# Patient Record
Sex: MALE | Race: OTHER | URBAN - METROPOLITAN AREA
[De-identification: names, ages, dates, MRNs, and addresses within clinical notes are randomized per-mention and may not be internally consistent; named-entity substitution may affect disease eponyms.]

---

## 2017-01-11 ENCOUNTER — APPOINTMENT (RX ONLY)
Dept: URBAN - METROPOLITAN AREA CLINIC 153 | Facility: CLINIC | Age: 82
Setting detail: DERMATOLOGY
End: 2017-01-11

## 2017-01-11 PROBLEM — C49.0 MALIGNANT NEOPLASM OF CONNECTIVE AND SOFT TISSUE OF HEAD, FACE AND NECK: Status: ACTIVE | Noted: 2017-01-11

## 2017-01-11 PROCEDURE — ? POST-OP WOUND CHECK

## 2017-01-11 NOTE — PROCEDURE: POST-OP WOUND CHECK
Add 62870 Cpt? (Important Note: In 2017 The Use Of 90088 Is Being Tracked By Cms To Determine Future Global Period Reimbursement For Global Periods): no
Body Location Override (Optional - Billing Will Still Be Based On Selected Body Map Location If Applicable): right antihelix
Detail Level: Detailed
Wound Evaluated By: Mary Miramontes PA-C

## 2017-06-12 ENCOUNTER — APPOINTMENT (RX ONLY)
Dept: URBAN - METROPOLITAN AREA CLINIC 153 | Facility: CLINIC | Age: 82
Setting detail: DERMATOLOGY
End: 2017-06-12

## 2017-06-12 VITALS — SYSTOLIC BLOOD PRESSURE: 136 MMHG | DIASTOLIC BLOOD PRESSURE: 88 MMHG | HEIGHT: 74 IN | WEIGHT: 210 LBS

## 2017-06-12 PROBLEM — C44.329 SQUAMOUS CELL CARCINOMA OF SKIN OF OTHER PARTS OF FACE: Status: ACTIVE | Noted: 2017-06-12

## 2017-06-12 PROBLEM — C44.629 SQUAMOUS CELL CARCINOMA OF SKIN OF LEFT UPPER LIMB, INCLUDING SHOULDER: Status: ACTIVE | Noted: 2017-06-12

## 2017-06-12 PROBLEM — L29.8 OTHER PRURITUS: Status: ACTIVE | Noted: 2017-06-12

## 2017-06-12 PROCEDURE — 13121 CMPLX RPR S/A/L 2.6-7.5 CM: CPT | Mod: 59

## 2017-06-12 PROCEDURE — 17314 MOHS ADDL STAGE T/A/L: CPT

## 2017-06-12 PROCEDURE — 17313 MOHS 1 STAGE T/A/L: CPT

## 2017-06-12 PROCEDURE — ? MOHS SURGERY

## 2017-06-12 PROCEDURE — 14041 TIS TRNFR F/C/C/M/N/A/G/H/F: CPT

## 2017-06-12 PROCEDURE — A4550 SURGICAL TRAYS: HCPCS

## 2017-06-12 PROCEDURE — 17311 MOHS 1 STAGE H/N/HF/G: CPT

## 2017-06-12 NOTE — HPI: PROCEDURE (MOHS)
Has The Growth Been Previously Biopsied?: has been previously biopsied
Additional History: The site on the forearm has been enlarging, red, and sore.  The right sideburn area has been rough and irritated from shaving.
Year Removed: 1900

## 2017-06-12 NOTE — PROCEDURE: MOHS SURGERY
Closure 4 Information: This tab is for additional flaps and grafts above and beyond our usual structured repairs.  Please note if you enter information here it will not currently bill and you will need to add the billing information manually.
Quadrants Reporting?: 0
Tarsorrhaphy Performed?: No
Subsequent Stages Histo Method Verbiage: The area was prepped in the usual fashion. Using a similar technique to that described above, a thin\\nlayer of tissue was removed from all areas where tumor was visible on the previous stage.  The tissue was again\\noriented, mapped, dyed, and processed as above. After hemostasis, the specimen was oriented, mapped and\\ndivided
Muscle Hinge Flap Text: The defect edges were debeveled with a #15 scalpel blade.  Given the size, depth and location of the defect and the proximity to free margins a muscle hinge flap was deemed most appropriate.  Using a sterile surgical marker, an appropriate hinge flap was drawn incorporating the defect. The area thus outlined was incised with a #15 scalpel blade.  The skin margins were undermined to an appropriate distance in all directions utilizing iris scissors.
Flap Type: Advancement Flap (Single)
Bill For Surgical Tray: yes
Stage 14: Additional Anesthesia Type: 1% lidocaine with epinephrine
Primary Defect Length In Cm (Final Defect Size - Required For Flaps/Grafts): 3.2
Stage 2: Number Of Blocks?: 1
Surgeon/Pathologist Verbiage (Will Incorporate Name Of Surgeon From Intro If Not Blank): operated in two distinct and integrated capacities as the surgeon and pathologist.
Bilobed Transposition Flap Text: The defect edges were debeveled with a #15 scalpel blade.  Given the location of the defect and the proximity to free margins a bilobed transposition flap was deemed most appropriate.  Using a sterile surgical marker, an appropriate bilobe flap drawn around the defect.    The area thus outlined was incised deep to adipose tissue with a #15 scalpel blade.  The skin margins were undermined to an appropriate distance in all directions utilizing iris scissors.
Initial Size Of Lesion: 2.2
Graft Donor Site Epidermal Sutures (Optional): 4-0 Monocryl
Area H Indication Text: Tumors in this location are included in\\nArea H (central face, eyelids, canthi, eyebrows, nose, lips, chin, ears, genitals, hands, feet, nail units, ankles, nipples and areola).
Area M Indication Text: Tumors in this location are included in\\nArea M (cheek, forehead, scalp, neck, jawline and pretibial skin).
Tumor Debulked?: curette
Bilobed Transposition Flap Text: The defect edges were debeveled with a #15 scalpel blade.  Given the location of the defect and the proximity to free margins a bilobed transposition flap was deemed most appropriate.  Using a sterile surgical marker, an appropriate bilobe flap drawn around the defect.    The area thus outlined was incised deep to adipose tissue with a #15 scalpel blade.  The skin margins were undermined to an appropriate distance in all directions utilizing iris scissors.
Subsequent Stages Histo Method Verbiage: The area was prepped in the usual fashion. Using a similar technique to that described above, a thin\\nlayer of tissue was removed from all areas where tumor was visible on the previous stage.  The tissue was again\\noriented, mapped, dyed, and processed as above. After hemostasis, the specimen was oriented, mapped and\\ndivided
Initial Size Of Lesion: 1.9
Tarsorrhaphy Text: A tarsorrhaphy was performed using Frost sutures.
Hemostasis: Electrocoagulation
Wound Check: 14 days
Complex Repair And Flap Additional Text (Will Appearing After The Standard Complex Repair Text): The complex repair was not sufficient to completely close the primary defect. The remaining additional defect was repaired with the flap mentioned below.
Closure 3 Information: This tab is for additional flaps and grafts above and beyond our usual structured repairs.  Please note if you enter information here it will not currently bill and you will need to add the billing information manually.
Primary Defect Width In Cm (Final Defect Size - Required For Flaps/Grafts): 2.5
Anesthesia Type: 1% lidocaine with 1:100,000 epinephrine and a 1:10 solution of 8.4% sodium bicarbonate
Medical Necessity Statement: Based on my medical judgement, Mohs surgery is the most appropriate treatment for this cancer compared to\\nother treatments. I discussed alternative treatments to Mohs surgery and specifically discussed the risks and\\nbenefits of curettage, excision with permanent sections, radiation therapy, and foregoing treatment. The rationale\\nfor Mohs surgery was explained to the patient and consent was obtained. The risks, benefits and alternatives to\\ntherapy were discussed in detail. Specifically, the risks of infection, scarring, bleeding, prolonged wound healing,\\nincomplete removal, allergy to anesthesia, nerve injury and recurrence were addressed. Prior to the procedure,\\nthe treatment site was clearly identified and confirmed by the patient. All components of Universal\\nProtocol/PAUSE Rule completed. All laboratory services were performed in the [facility], [address] [place] [address], [address], [place] [address].  Special stains are not necessarily approved by the U.S. Food and Drug Administration and are used to improve diagnostic accuracy.
Epidermal Closure Graft Donor Site (Optional): running
Post-Care Instructions: I reviewed in detail the post-care instructions.
Anesthesia Volume In Cc: 7.5
Consent (Lip)/Introductory Paragraph: The rationale for Mohs was explained to the patient and consent was obtained. The risks, benefits and alternatives to therapy were discussed in detail. Specifically, the risks of lip deformity, changes in the oral aperture, infection, scarring, bleeding, prolonged wound healing, incomplete removal, allergy to anesthesia, nerve injury and recurrence were addressed. Prior to the procedure, the treatment site was clearly identified and confirmed by the patient. All components of Universal Protocol/PAUSE Rule completed.
Dressing: pressure dressing with telfa
Skin Substitute Text: The defect edges were debeveled with a #15 scalpel blade.  Given the location of the defect, shape of the defect and the proximity to free margins a skin substitute graft was deemed most appropriate.  The graft material was trimmed to fit the size of the defect. The graft was then placed in the primary defect and oriented appropriately.
Consent Type: Consent 1 (Standard)
Mauc Instructions: By selecting yes to the question below the MAUC number will be added into the note.  This will be calculated automatically based on the diagnosis chosen, the size entered, the body zone selected (H,M,L) and the specific indications you chose. You will also have the option to override the Mohs AUC if you disagree with the automatically calculated number and this option is found in the Case Summary tab.
Surgeon Performing Repair (Optional): [facility] M.D.
Repair Anesthesia Method: local infiltration
Surgeon: [facility] M.D.
Mohs Method Verbiage: An incision at a 45 degree angle following the standard Mohs\\napproach was done and the specimen was harvested as a microscopically controlled layer.
Unna Boot Text: An Unna boot was placed to help immobilize the limb and facilitate more rapid healing.
Mohs Case Number: [id number]
Graft Basting Suture (Optional): 6-0 Fast Absorbing Gut
Location Indication Override (Is Already Calculated Based On Selected Body Location): Area L
Complex Repair And Graft Additional Text (Will Appearing After The Standard Complex Repair Text): The complex repair was not sufficient to completely close the primary defect. The remaining additional defect was repaired with the graft mentioned below.
Mohs Histo Method Verbiage: Each section was then chromacoded and processed in the Mohs lab using the Mohs protocol and submitted for frozen section.
Postop Diagnosis: same
Surgeon: [facility] M.D.
Location Indication Override (Is Already Calculated Based On Selected Body Location): Area H
Eye Protection Verbiage: Before proceeding with the stage, a plastic scleral shield was inserted. The globe was anesthetized with a few drops of 1% lidocaine with 1:100,000 epinephrine. Then, an appropriate sized scleral shield was chosen and coated with lacrilube ointment. The shield was gently inserted and left in place for the duration of each stage. After the stage was completed, the shield was gently removed.
Skin Substitute Text: The defect edges were debeveled with a #15 scalpel blade.  Given the location of the defect, shape of the defect and the proximity to free margins a skin substitute graft was deemed most appropriate.  The graft material was trimmed to fit the size of the defect. The graft was then placed in the primary defect and oriented appropriately.
Secondary Intention Text (Leave Blank If You Do Not Want): The defect will heal with secondary intention.
Star Wedge Flap Text: The defect edges were debeveled with a #15 scalpel blade.  Given the location of the defect, shape of the defect and the proximity to free margins a star wedge flap was deemed most appropriate.  Using a sterile surgical marker, an appropriate rotation flap was drawn incorporating the defect and placing the expected incisions within the relaxed skin tension lines where possible. The area thus outlined was incised deep to adipose tissue with a #15 scalpel blade.  The skin margins were undermined to an appropriate distance in all directions utilizing iris scissors.
Partial Purse String (Simple) Text: Given the location of the defect and the characteristics of the surrounding skin a simple purse string closure was deemed most appropriate.  Undermining was performed circumfirentially around the surgical defect.  A purse string suture was then placed and tightened. Wound tension only allowed a partial closure of the circular defect.
Secondary Defect Length In Cm (Required For Flaps): 3.7
Closure 2 Information: This tab is for additional flaps and grafts, including complex repair and grafts and complex repair and flaps. You can also specify a different location for the additional defect, if the location is the same you do not need to select a new one. We will insert the automated text for the repair you select below just as we do for solitary flaps and grafts. Please note that at this time if you select a location with a different insurance zone you will need to override the ICD10 and CPT if appropriate.
Anesthesia Volume In Cc: 3
Referred To Mid-Level For Closure Text (Leave Blank If You Do Not Want): After obtaining clear surgical margins the patient was sent to a mid-level provider for surgical repair.  The patient understands they will receive post-surgical care and follow-up from the mid-level provider.
Detail Level: Detailed
Anesthesia Volume In Cc: 5
Referring Physician (Optional): JESSY Long M.D.
Referred To Mid-Level For Closure Text (Leave Blank If You Do Not Want): After obtaining clear surgical margins the patient was sent to a mid-level provider for surgical repair.  The patient understands they will receive post-surgical care and follow-up from the mid-level provider.
Simple / Intermediate / Complex Repair - Final Wound Length In Cm: 6.4
Number Of Stages: 2
Partial Purse String (Intermediate) Text: Given the location of the defect and the characteristics of the surrounding skin an intermediate purse string closure was deemed most appropriate.  Undermining was performed circumfirentially around the surgical defect.  A purse string suture was then placed and tightened. Wound tension only allowed a partial closure of the circular defect.
X Size Of Lesion In Cm (Optional): 1.5
Estimated Blood Loss (Cc): minimal
Repair Type: Complex Repair
Manual Repair Warning Statement: We plan on removing the manually selected variable below in favor of our much easier automatic structured text blocks found in the previous tab. We decided to do this to help make the flow better and give you the full power of structured data. Manual selection is never going to be ideal in our platform and I would encourage you to avoid using manual selection from this point on, especially since I will be sunsetting this feature. It is important that you do one of two things with the customized text below. First, you can save all of the text in a word file so you can have it for future reference. Second, transfer the text to the appropriate area in the Library tab. Lastly, if there is a flap or graft type which we do not have you need to let us know right away so I can add it in before the variable is hidden. No need to panic, we plan to give you roughly 6 months to make the change.
Star Wedge Flap Text: The defect edges were debeveled with a #15 scalpel blade.  Given the location of the defect, shape of the defect and the proximity to free margins a star wedge flap was deemed most appropriate.  Using a sterile surgical marker, an appropriate rotation flap was drawn incorporating the defect and placing the expected incisions within the relaxed skin tension lines where possible. The area thus outlined was incised deep to adipose tissue with a #15 scalpel blade.  The skin margins were undermined to an appropriate distance in all directions utilizing iris scissors.
Anesthesia Type: 1% lidocaine with epinephrine and a 1:10 solution of 8.4% sodium bicarbonate
Mauc Instructions: By selecting yes to the question below the MAUC number will be added into the note.  This will be calculated automatically based on the diagnosis chosen, the size entered, the body zone selected (H,M,L) and the specific indications you chose. You will also have the option to override the Mohs AUC if you disagree with the automatically calculated number and this option is found in the Case Summary tab.
Primary Defect Width In Cm (Final Defect Size - Required For Flaps/Grafts): 1.6
Partial Purse String (Intermediate) Text: Given the location of the defect and the characteristics of the surrounding skin an intermediate purse string closure was deemed most appropriate.  Undermining was performed circumfirentially around the surgical defect.  A purse string suture was then placed and tightened. Wound tension only allowed a partial closure of the circular defect.
Area L Indication Text: Tumors in this location are included in\\nArea L (trunk and extremities).
Keystone Flap Text: The defect edges were debeveled with a #15 scalpel blade.  Given the location of the defect, shape of the defect a keystone flap was deemed most appropriate.  Using a sterile surgical marker, an appropriate keystone flap was drawn incorporating the defect, outlining the appropriate donor tissue and placing the expected incisions within the relaxed skin tension lines where possible. The area thus outlined was incised deep to adipose tissue with a #15 scalpel blade.  The skin margins were undermined to an appropriate distance in all directions around the primary defect and laterally outward around the flap utilizing iris scissors.
Wound Care: Polysporin ointment
Body Location Override (Optional - Billing Will Still Be Based On Selected Body Map Location If Applicable): Right zygoma
Muscle Hinge Flap Text: The defect edges were debeveled with a #15 scalpel blade.  Given the size, depth and location of the defect and the proximity to free margins a muscle hinge flap was deemed most appropriate.  Using a sterile surgical marker, an appropriate hinge flap was drawn incorporating the defect. The area thus outlined was incised with a #15 scalpel blade.  The skin margins were undermined to an appropriate distance in all directions utilizing iris scissors.
Partial Purse String (Simple) Text: Given the location of the defect and the characteristics of the surrounding skin a simple purse string closure was deemed most appropriate.  Undermining was performed circumfirentially around the surgical defect.  A purse string suture was then placed and tightened. Wound tension only allowed a partial closure of the circular defect.
Medical Necessity Statement: Based on my medical judgement, Mohs surgery is the most appropriate treatment for this cancer compared to\\nother treatments. I discussed alternative treatments to Mohs surgery and specifically discussed the risks and\\nbenefits of curettage, excision with permanent sections, radiation therapy, and foregoing treatment. The rationale\\nfor Mohs surgery was explained to the patient and consent was obtained. The risks, benefits and alternatives to\\ntherapy were discussed in detail. Specifically, the risks of infection, scarring, bleeding, prolonged wound healing,\\nincomplete removal, allergy to anesthesia, nerve injury and recurrence were addressed. Prior to the procedure,\\nthe treatment site was clearly identified and confirmed by the patient. All components of Universal\\nProtocol/PAUSE Rule completed. All laboratory services were performed in the [facility], [address] [place] [address], [address], [place] [address].  Special stains are not necessarily approved by the U.S. Food and Drug Administration and are used to improve diagnostic accuracy.
Keystone Flap Text: The defect edges were debeveled with a #15 scalpel blade.  Given the location of the defect, shape of the defect a keystone flap was deemed most appropriate.  Using a sterile surgical marker, an appropriate keystone flap was drawn incorporating the defect, outlining the appropriate donor tissue and placing the expected incisions within the relaxed skin tension lines where possible. The area thus outlined was incised deep to adipose tissue with a #15 scalpel blade.  The skin margins were undermined to an appropriate distance in all directions around the primary defect and laterally outward around the flap utilizing iris scissors.
Surgeon Performing Repair (Optional): [facility] M.D.
Body Location Override (Optional - Billing Will Still Be Based On Selected Body Map Location If Applicable): Left extensor forearm
Additional Anesthesia Volume In Cc: 6
Epidermal Closure: running and interrupted
Repair Type: Flap

## 2017-06-26 ENCOUNTER — APPOINTMENT (RX ONLY)
Dept: URBAN - METROPOLITAN AREA CLINIC 153 | Facility: CLINIC | Age: 82
Setting detail: DERMATOLOGY
End: 2017-06-26

## 2017-06-26 PROBLEM — C44.329 SQUAMOUS CELL CARCINOMA OF SKIN OF OTHER PARTS OF FACE: Status: ACTIVE | Noted: 2017-06-26

## 2017-06-26 PROBLEM — Z85.46 PERSONAL HISTORY OF MALIGNANT NEOPLASM OF PROSTATE: Status: ACTIVE | Noted: 2017-06-26

## 2017-06-26 PROBLEM — C44.629 SQUAMOUS CELL CARCINOMA OF SKIN OF LEFT UPPER LIMB, INCLUDING SHOULDER: Status: ACTIVE | Noted: 2017-06-26

## 2017-06-26 PROCEDURE — ? SUTURE REMOVAL (GLOBAL PERIOD)

## 2017-06-26 NOTE — PROCEDURE: SUTURE REMOVAL (GLOBAL PERIOD)
Detail Level: Detailed
Add 03108 Cpt? (Important Note: In 2017 The Use Of 94299 Is Being Tracked By Cms To Determine Future Global Period Reimbursement For Global Periods): no

## 2018-11-06 ENCOUNTER — APPOINTMENT (RX ONLY)
Dept: URBAN - METROPOLITAN AREA CLINIC 153 | Facility: CLINIC | Age: 83
Setting detail: DERMATOLOGY
End: 2018-11-06

## 2018-11-06 VITALS — HEIGHT: 74 IN | SYSTOLIC BLOOD PRESSURE: 147 MMHG | DIASTOLIC BLOOD PRESSURE: 98 MMHG | WEIGHT: 200 LBS

## 2018-11-06 DIAGNOSIS — Z85.828 PERSONAL HISTORY OF OTHER MALIGNANT NEOPLASM OF SKIN: ICD-10-CM

## 2018-11-06 DIAGNOSIS — L82.1 OTHER SEBORRHEIC KERATOSIS: ICD-10-CM

## 2018-11-06 DIAGNOSIS — L57.0 ACTINIC KERATOSIS: ICD-10-CM

## 2018-11-06 PROBLEM — H91.90 UNSPECIFIED HEARING LOSS, UNSPECIFIED EAR: Status: ACTIVE | Noted: 2018-11-06

## 2018-11-06 PROBLEM — Z85.46 PERSONAL HISTORY OF MALIGNANT NEOPLASM OF PROSTATE: Status: ACTIVE | Noted: 2018-11-06

## 2018-11-06 PROBLEM — D23.61 OTHER BENIGN NEOPLASM OF SKIN OF RIGHT UPPER LIMB, INCLUDING SHOULDER: Status: ACTIVE | Noted: 2018-11-06

## 2018-11-06 PROCEDURE — ? LIQUID NITROGEN

## 2018-11-06 PROCEDURE — ? COUNSELING

## 2018-11-06 PROCEDURE — 17004 DESTROY PREMAL LESIONS 15/>: CPT

## 2018-11-06 ASSESSMENT — LOCATION ZONE DERM
LOCATION ZONE: EAR
LOCATION ZONE: SCALP
LOCATION ZONE: NECK
LOCATION ZONE: TRUNK
LOCATION ZONE: NOSE
LOCATION ZONE: ARM
LOCATION ZONE: FACE

## 2018-11-06 ASSESSMENT — LOCATION DETAILED DESCRIPTION DERM
LOCATION DETAILED: RIGHT SUPERIOR FRONTAL SCALP
LOCATION DETAILED: LEFT CENTRAL LATERAL NECK
LOCATION DETAILED: RIGHT INFERIOR POSTERIOR HELIX
LOCATION DETAILED: RIGHT SUPERIOR CENTRAL MALAR CHEEK
LOCATION DETAILED: RIGHT LATERAL FOREHEAD
LOCATION DETAILED: RIGHT SUPERIOR PREAURICULAR CHEEK
LOCATION DETAILED: LEFT INFERIOR HELIX
LOCATION DETAILED: NASAL DORSUM
LOCATION DETAILED: LEFT LATERAL MALAR CHEEK
LOCATION DETAILED: RIGHT SUPERIOR LATERAL MALAR CHEEK
LOCATION DETAILED: LEFT SUPERIOR HELIX
LOCATION DETAILED: LEFT SUPERIOR POSTAURICULAR SKIN
LOCATION DETAILED: RIGHT INFERIOR MEDIAL FOREHEAD
LOCATION DETAILED: RIGHT SUPERIOR MEDIAL UPPER BACK
LOCATION DETAILED: LEFT CENTRAL FRONTAL SCALP
LOCATION DETAILED: LEFT SUPERIOR POSTERIOR HELIX
LOCATION DETAILED: LEFT DISTAL POSTERIOR UPPER ARM
LOCATION DETAILED: LEFT TRIANGULAR FOSSA
LOCATION DETAILED: LEFT MEDIAL INFERIOR CHEST

## 2018-11-06 ASSESSMENT — LOCATION SIMPLE DESCRIPTION DERM
LOCATION SIMPLE: RIGHT UPPER BACK
LOCATION SIMPLE: CHEST
LOCATION SIMPLE: NECK
LOCATION SIMPLE: LEFT SCALP
LOCATION SIMPLE: RIGHT FOREHEAD
LOCATION SIMPLE: RIGHT CHEEK
LOCATION SIMPLE: RIGHT EAR
LOCATION SIMPLE: LEFT CHEEK
LOCATION SIMPLE: LEFT EAR
LOCATION SIMPLE: LEFT UPPER ARM
LOCATION SIMPLE: NOSE
LOCATION SIMPLE: SCALP

## 2018-11-06 NOTE — PROCEDURE: LIQUID NITROGEN
Post-Care Instructions: I reviewed with the patient in detail post-care instructions. Patient is to wear sunprotection, and avoid picking at any of the treated lesions. Pt may apply Vaseline to crusted or scabbing areas.
Detail Level: Detailed
Number Of Freeze-Thaw Cycles: 2 freeze-thaw cycles
Render Post-Care Instructions In Note?: no
Consent: The patient's consent was obtained including but not limited to risks of crusting, scabbing, blistering, scarring, darker or lighter pigmentary change, recurrence, incomplete removal and infection.
Duration Of Freeze Thaw-Cycle (Seconds): 0

## 2018-12-04 ENCOUNTER — APPOINTMENT (RX ONLY)
Dept: URBAN - METROPOLITAN AREA CLINIC 153 | Facility: CLINIC | Age: 83
Setting detail: DERMATOLOGY
End: 2018-12-04

## 2018-12-04 DIAGNOSIS — D485 NEOPLASM OF UNCERTAIN BEHAVIOR OF SKIN: ICD-10-CM

## 2018-12-04 PROBLEM — D48.5 NEOPLASM OF UNCERTAIN BEHAVIOR OF SKIN: Status: ACTIVE | Noted: 2018-12-04

## 2018-12-04 PROCEDURE — 69100 BIOPSY OF EXTERNAL EAR: CPT

## 2018-12-04 PROCEDURE — ? BIOPSY BY SHAVE METHOD

## 2018-12-04 ASSESSMENT — LOCATION DETAILED DESCRIPTION DERM: LOCATION DETAILED: LEFT INFERIOR HELIX

## 2018-12-04 ASSESSMENT — LOCATION ZONE DERM: LOCATION ZONE: EAR

## 2018-12-04 ASSESSMENT — LOCATION SIMPLE DESCRIPTION DERM: LOCATION SIMPLE: LEFT EAR

## 2018-12-04 NOTE — PROCEDURE: BIOPSY BY SHAVE METHOD
Billing Type: Third-Party Bill
Destruction After The Procedure: No
Curettage Text: After shave biopsy the base of the lesion was removed with curettage.
Electrodesiccation And Curettage Text: The wound bed was treated with electrodesiccation and curettage after the biopsy was performed.
Biopsy Type: H and E
Biopsy Method: Dermablade
Consent: Written consent was obtained and risks were reviewed including but not limited to scarring, infection, bleeding, scabbing, incomplete removal, nerve damage and allergy to anesthesia.
X Size Of Lesion In Cm: 0
Path Notes (To The Dermatopathologist): Pink keratotic papule, rule out BCC vs HAK
Notification Instructions: Patient will be notified of biopsy results. However, patient instructed to call the office if not contacted within 2 weeks.
Hemostasis: Drysol
Electrodesiccation Text: The wound bed was treated with electrodesiccation after the biopsy was performed.
Silver Nitrate Text: The wound bed was treated with silver nitrate after the biopsy was performed.
Anesthesia Type: 1% lidocaine with epinephrine and a 1:10 solution of 8.4% sodium bicarbonate
Depth Of Biopsy: dermis
Was A Bandage Applied: Yes
Post-Care Instructions: I reviewed with the patient in detail post-care instructions.
Type Of Destruction Used: Curettage
Dressing: telfa dressing
Cryotherapy Text: The wound bed was treated with cryotherapy after the biopsy was performed.
Detail Level: Detailed
Anesthesia Volume In Cc (Will Not Render If 0): 1.7
Wound Care: Polysporin ointment

## 2018-12-28 ENCOUNTER — APPOINTMENT (RX ONLY)
Dept: URBAN - METROPOLITAN AREA CLINIC 153 | Facility: CLINIC | Age: 83
Setting detail: DERMATOLOGY
End: 2018-12-28

## 2018-12-28 VITALS — SYSTOLIC BLOOD PRESSURE: 141 MMHG | DIASTOLIC BLOOD PRESSURE: 75 MMHG | WEIGHT: 200 LBS | HEIGHT: 74 IN

## 2018-12-28 PROBLEM — C44.229 SQUAMOUS CELL CARCINOMA OF SKIN OF LEFT EAR AND EXTERNAL AURICULAR CANAL: Status: ACTIVE | Noted: 2018-12-28

## 2018-12-28 PROCEDURE — ? MOHS SURGERY

## 2018-12-28 PROCEDURE — A4550 SURGICAL TRAYS: HCPCS

## 2018-12-28 PROCEDURE — 17311 MOHS 1 STAGE H/N/HF/G: CPT

## 2018-12-28 PROCEDURE — 17312 MOHS ADDL STAGE: CPT

## 2018-12-28 PROCEDURE — 14060 TIS TRNFR E/N/E/L 10 SQ CM/<: CPT

## 2018-12-28 NOTE — PROCEDURE: MOHS SURGERY
Oculoplastic Surgeon Procedure Text (D): After obtaining clear surgical margins the patient was sent to oculoplastics for surgical repair.  The patient understands they will receive post-surgical care and follow-up from the referring physician's office.
Eye Protection Verbiage: Before proceeding with the stage, a plastic scleral shield was inserted. The globe was anesthetized with a few drops of 1% lidocaine with 1:100,000 epinephrine. Then, an appropriate sized scleral shield was chosen and coated with lacrilube ointment. The shield was gently inserted and left in place for the duration of each stage. After the stage was completed, the shield was gently removed.
Stage 5: Additional Anesthesia Volume In Cc: 0
Patient Will Remove Sutures At Home?: No
M-Plasty Complex Repair Preamble Text (Leave Blank If You Do Not Want): Extensive wide undermining was performed.
Show Biopsy Photo Reviewed Variable: Yes
Partial Purse String (Intermediate) Text: Given the location of the defect and the characteristics of the surrounding skin an intermediate purse string closure was deemed most appropriate.  Undermining was performed circumfirentially around the surgical defect.  A purse string suture was then placed and tightened. Wound tension only allowed a partial closure of the circular defect.
M-Plasty Intermediate Repair Preamble Text (Leave Blank If You Do Not Want): Undermining was performed with blunt dissection.
Anesthesia Type: 1% lidocaine with epinephrine and a 1:10 solution of 8.4% sodium bicarbonate
Stage 9: Additional Anesthesia Type: 1% lidocaine with epinephrine
Information: Selecting Yes will display possible errors in your note based on the variables you have selected. This validation is only offered as a suggestion for you. PLEASE NOTE THAT THE VALIDATION TEXT WILL BE REMOVED WHEN YOU FINALIZE YOUR NOTE. IF YOU WANT TO FAX A PRELIMINARY NOTE YOU WILL NEED TO TOGGLE THIS TO 'NO' IF YOU DO NOT WANT IT IN YOUR FAXED NOTE.
Epidermal Closure Graft Donor Site (Optional): running
Anesthesia Volume In Cc: 2.8
Dressing: pressure dressing with telfa
Dressing (No Sutures): dry sterile dressing
Wound Care (No Sutures): Petrolatum
Plastic Surgeon Procedure Text (B): After obtaining clear surgical margins the patient was sent to plastics for surgical repair.  The patient understands they will receive post-surgical care and follow-up from the referring physician's office.
Surgeon Performing Repair (Optional): Nehemiah Hoffman M.D.
Subsequent Stages Histo Method Verbiage: The area was prepped in the usual fashion. Using a similar technique to that described above, a thin\\nlayer of tissue was removed from all areas where tumor was visible on the previous stage.  The tissue was again\\noriented, mapped, dyed, and processed as above. After hemostasis, the specimen was oriented, mapped and\\ndivided
Star Wedge Flap Text: The defect edges were debeveled with a #15 scalpel blade.  Given the location of the defect, shape of the defect and the proximity to free margins a star wedge flap was deemed most appropriate.  Using a sterile surgical marker, an appropriate rotation flap was drawn incorporating the defect and placing the expected incisions within the relaxed skin tension lines where possible. The area thus outlined was incised deep to adipose tissue with a #15 scalpel blade.  The skin margins were undermined to an appropriate distance in all directions utilizing iris scissors.
Provider Procedure Text (D): After obtaining clear surgical margins the defect was repaired by another provider.
Closure 2 Information: This tab is for additional flaps and grafts, including complex repair and grafts and complex repair and flaps. You can also specify a different location for the additional defect, if the location is the same you do not need to select a new one. We will insert the automated text for the repair you select below just as we do for solitary flaps and grafts. Please note that at this time if you select a location with a different insurance zone you will need to override the ICD10 and CPT if appropriate.
Muscle Hinge Flap Text: The defect edges were debeveled with a #15 scalpel blade.  Given the size, depth and location of the defect and the proximity to free margins a muscle hinge flap was deemed most appropriate.  Using a sterile surgical marker, an appropriate hinge flap was drawn incorporating the defect. The area thus outlined was incised with a #15 scalpel blade.  The skin margins were undermined to an appropriate distance in all directions utilizing iris scissors.
Oculoplastic Surgeon Procedure Text (A): After obtaining clear surgical margins the patient was sent to oculoplastics for surgical repair.  The patient understands they will receive post-surgical care and follow-up from the referring physician's office.
Otolaryngologist Procedure Text (C): After obtaining clear surgical margins the patient was sent to otolaryngology for surgical repair.  The patient understands they will receive post-surgical care and follow-up from the referring physician's office.
Otolaryngologist Procedure Text (E): After obtaining clear surgical margins the patient was sent to otolaryngology for surgical repair.  The patient understands they will receive post-surgical care and follow-up from the referring physician's office.
Graft Donor Site Epidermal Sutures (Optional): 4-0 Monocryl
Wound Care: Polysporin ointment
Post-Care Instructions: I reviewed in detail the post-care instructions.
Mid-Level Procedure Text (D): After obtaining clear surgical margins the patient was sent to a mid-level provider for surgical repair.  The patient understands they will receive post-surgical care and follow-up from the mid-level provider.
Plastic Surgeon Procedure Text (F): After obtaining clear surgical margins the patient was sent to plastics for surgical repair.  The patient understands they will receive post-surgical care and follow-up from the referring physician's office.
Number Of Stages: 2
X Size Of Lesion In Cm (Optional): 1
Area M Indication Text: Tumors in this location are included in\\nArea M (cheek, forehead, scalp, neck, jawline and pretibial skin).
Detail Level: Detailed
Stage 2: Additional Anesthesia Type: 1% lidocaine with 1:100,000 epinephrine and a 1:10 solution of 8.4% sodium bicarbonate
Epidermal Closure: running and interrupted
Postop Diagnosis: same
Skin Substitute Text: The defect edges were debeveled with a #15 scalpel blade.  Given the location of the defect, shape of the defect and the proximity to free margins a skin substitute graft was deemed most appropriate.  The graft material was trimmed to fit the size of the defect. The graft was then placed in the primary defect and oriented appropriately.
Asc Procedure Text (A): After obtaining clear surgical margins the patient was sent to an ASC for surgical repair.  The patient understands they will receive post-surgical care and follow-up from the ASC physician.
Unna Boot Text: An Unna boot was placed to help immobilize the limb and facilitate more rapid healing.
Referred To Mid-Level For Closure Text (Leave Blank If You Do Not Want): After obtaining clear surgical margins the patient was sent to a mid-level provider for surgical repair.  The patient understands they will receive post-surgical care and follow-up from the mid-level provider.
Repair Type: Flap
Surgeon/Pathologist Verbiage (Will Incorporate Name Of Surgeon From Intro If Not Blank): operated in two distinct and integrated capacities as the surgeon and pathologist.
Mohs Histo Method Verbiage: Each section was then chromacoded and processed in the Mohs lab using the Mohs protocol and submitted for frozen section.
Suture Removal: 14 days
Secondary Intention Text (Leave Blank If You Do Not Want): The defect will heal with secondary intention.
Tarsorrhaphy Text: A tarsorrhaphy was performed using Frost sutures.
Mohs Method Verbiage: An incision at a 45 degree angle following the standard Mohs\\napproach was done and the specimen was harvested as a microscopically controlled layer.
Initial Size Of Lesion: 1.5
Body Location Override (Optional - Billing Will Still Be Based On Selected Body Map Location If Applicable): Left inferior helix
Asc Procedure Text (D): After obtaining clear surgical margins the patient was sent to an ASC for surgical repair.  The patient understands they will receive post-surgical care and follow-up from the ASC physician.
Closure 4 Information: This tab is for additional flaps and grafts above and beyond our usual structured repairs.  Please note if you enter information here it will not currently bill and you will need to add the billing information manually.
Mercedes Flap Text: The defect edges were debeveled with a #15 scalpel blade.  Given the location of the defect, shape of the defect and the proximity to free margins a Mercedes flap was deemed most appropriate.  Using a sterile surgical marker, an appropriate advancement flap was drawn incorporating the defect and placing the expected incisions within the relaxed skin tension lines where possible. The area thus outlined was incised deep to adipose tissue with a #15 scalpel blade.  The skin margins were undermined to an appropriate distance in all directions utilizing iris scissors.
Deep Sutures: 5-0 Monocryl
Surgeon: Nehemiah Hoffman M.D.
Closure 3 Information: This tab is for additional flaps and grafts above and beyond our usual structured repairs.  Please note if you enter information here it will not currently bill and you will need to add the billing information manually.
Secondary Defect Width In Cm (Required For Flaps): 1.7
Graft Cartilage Fenestration Text: The cartilage was fenestrated with a 2mm punch biopsy to help facilitate graft survival and healing.
Area H Indication Text: Tumors in this location are included in\\nArea H (central face, eyelids, canthi, eyebrows, nose, lips, chin, ears, genitals, hands, feet, nail units, ankles, nipples and areola).
Complex Repair And Flap Additional Text (Will Appearing After The Standard Complex Repair Text): The complex repair was not sufficient to completely close the primary defect. The remaining additional defect was repaired with the flap mentioned below.
Secondary Defect Length In Cm (Required For Flaps): 2.2
Tumor Debulked?: curette
Partial Purse String (Simple) Text: Given the location of the defect and the characteristics of the surrounding skin a simple purse string closure was deemed most appropriate.  Undermining was performed circumfirentially around the surgical defect.  A purse string suture was then placed and tightened. Wound tension only allowed a partial closure of the circular defect.
Graft Basting Suture (Optional): 6-0 Fast Absorbing Gut
Flap Type: Advancement Flap (Single)
Consent Type: Consent 1 (Standard)
Mohs Case Number: [id number]
Consent (Lip)/Introductory Paragraph: The rationale for Mohs was explained to the patient and consent was obtained. The risks, benefits and alternatives to therapy were discussed in detail. Specifically, the risks of lip deformity, changes in the oral aperture, infection, scarring, bleeding, prolonged wound healing, incomplete removal, allergy to anesthesia, nerve injury and recurrence were addressed. Prior to the procedure, the treatment site was clearly identified and confirmed by the patient. All components of Universal Protocol/PAUSE Rule completed.
Location Indication Override (Is Already Calculated Based On Selected Body Location): Area H
Bilobed Transposition Flap Text: The defect edges were debeveled with a #15 scalpel blade.  Given the location of the defect and the proximity to free margins a bilobed transposition flap was deemed most appropriate.  Using a sterile surgical marker, an appropriate bilobe flap drawn around the defect.    The area thus outlined was incised deep to adipose tissue with a #15 scalpel blade.  The skin margins were undermined to an appropriate distance in all directions utilizing iris scissors.
Primary Defect Width In Cm (Final Defect Size - Required For Flaps/Grafts): 1.6
Primary Defect Length In Cm (Final Defect Size - Required For Flaps/Grafts): 1.8
Repair Anesthesia Method: local infiltration
Non-Graft Cartilage Fenestration Text: The cartilage was fenestrated with a 2mm punch biopsy to help facilitate healing.
Keystone Flap Text: The defect edges were debeveled with a #15 scalpel blade.  Given the location of the defect, shape of the defect a keystone flap was deemed most appropriate.  Using a sterile surgical marker, an appropriate keystone flap was drawn incorporating the defect, outlining the appropriate donor tissue and placing the expected incisions within the relaxed skin tension lines where possible. The area thus outlined was incised deep to adipose tissue with a #15 scalpel blade.  The skin margins were undermined to an appropriate distance in all directions around the primary defect and laterally outward around the flap utilizing iris scissors.
Manual Repair Warning Statement: We plan on removing the manually selected variable below in favor of our much easier automatic structured text blocks found in the previous tab. We decided to do this to help make the flow better and give you the full power of structured data. Manual selection is never going to be ideal in our platform and I would encourage you to avoid using manual selection from this point on, especially since I will be sunsetting this feature. It is important that you do one of two things with the customized text below. First, you can save all of the text in a word file so you can have it for future reference. Second, transfer the text to the appropriate area in the Library tab. Lastly, if there is a flap or graft type which we do not have you need to let us know right away so I can add it in before the variable is hidden. No need to panic, we plan to give you roughly 6 months to make the change.
Additional Anesthesia Volume In Cc: 6
Complex Repair And Graft Additional Text (Will Appearing After The Standard Complex Repair Text): The complex repair was not sufficient to completely close the primary defect. The remaining additional defect was repaired with the graft mentioned below.
Anesthesia Volume In Cc: 1.4
Mauc Instructions: By selecting yes to the question below the MAUC number will be added into the note.  This will be calculated automatically based on the diagnosis chosen, the size entered, the body zone selected (H,M,L) and the specific indications you chose. You will also have the option to override the Mohs AUC if you disagree with the automatically calculated number and this option is found in the Case Summary tab.
Repair Hemostasis (Optional): Electrocoagulation
Area L Indication Text: Tumors in this location are included in\\nArea L (trunk and extremities).
Banner Transposition Flap Text: The defect edges were debeveled with a #15 scalpel blade.  Given the location of the defect and the proximity to free margins a Banner transposition flap was deemed most appropriate.  Using a sterile surgical marker, an appropriate flap drawn around the defect. The area thus outlined was incised deep to adipose tissue with a #15 scalpel blade.  The skin margins were undermined to an appropriate distance in all directions utilizing iris scissors.
Donor Site Anesthesia Type: same as repair anesthesia
Estimated Blood Loss (Cc): minimal
Medical Necessity Statement: Based on my medical judgement, Mohs surgery is the most appropriate treatment for this cancer compared to\\nother treatments. I discussed alternative treatments to Mohs surgery and specifically discussed the risks and\\nbenefits of curettage, excision with permanent sections, radiation therapy, and foregoing treatment. The rationale\\nfor Mohs surgery was explained to the patient and consent was obtained. The risks, benefits and alternatives to\\ntherapy were discussed in detail. Specifically, the risks of infection, scarring, bleeding, prolonged wound healing,\\nincomplete removal, allergy to anesthesia, nerve injury and recurrence were addressed. Prior to the procedure,\\nthe treatment site was clearly identified and confirmed by the patient. All components of Universal\\nProtocol/PAUSE Rule completed. All laboratory services were performed in the [facility], [address] [place] [address], [address], [place] [address].  Special stains are not necessarily approved by the [place] Food and Drug Administration and are used to improve diagnostic accuracy.
Stage 2: Additional Anesthesia Volume In Cc: 0.8

## 2019-01-10 ENCOUNTER — APPOINTMENT (RX ONLY)
Dept: URBAN - METROPOLITAN AREA CLINIC 153 | Facility: CLINIC | Age: 84
Setting detail: DERMATOLOGY
End: 2019-01-10

## 2019-01-10 PROBLEM — C44.229 SQUAMOUS CELL CARCINOMA OF SKIN OF LEFT EAR AND EXTERNAL AURICULAR CANAL: Status: ACTIVE | Noted: 2019-01-10

## 2019-01-10 PROBLEM — I10 ESSENTIAL (PRIMARY) HYPERTENSION: Status: ACTIVE | Noted: 2019-01-10

## 2019-01-10 PROCEDURE — ? SUTURE REMOVAL (GLOBAL PERIOD)

## 2019-01-10 NOTE — PROCEDURE: SUTURE REMOVAL (GLOBAL PERIOD)
Detail Level: Detailed
Add 06510 Cpt? (Important Note: In 2017 The Use Of 97509 Is Being Tracked By Cms To Determine Future Global Period Reimbursement For Global Periods): no

## 2019-02-12 ENCOUNTER — HOSPITAL ENCOUNTER (OUTPATIENT)
Dept: HOSPITAL 88 - OR | Age: 84
Discharge: HOME | End: 2019-02-12
Attending: PODIATRIST
Payer: MEDICARE

## 2019-02-12 VITALS — DIASTOLIC BLOOD PRESSURE: 69 MMHG | SYSTOLIC BLOOD PRESSURE: 141 MMHG

## 2019-02-12 DIAGNOSIS — M89.371: ICD-10-CM

## 2019-02-12 DIAGNOSIS — Z88.6: ICD-10-CM
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
## 2019-02-12 DIAGNOSIS — Z88.0: ICD-10-CM

## 2019-02-12 DIAGNOSIS — Z95.0: ICD-10-CM

## 2019-02-12 DIAGNOSIS — M10.9: ICD-10-CM

## 2019-02-12 DIAGNOSIS — Z87.891: ICD-10-CM

## 2019-02-12 DIAGNOSIS — M19.071: ICD-10-CM

## 2019-02-12 DIAGNOSIS — S92.811A: Primary | ICD-10-CM

## 2019-02-12 DIAGNOSIS — Z85.46: ICD-10-CM

## 2019-02-12 DIAGNOSIS — I10: ICD-10-CM

## 2019-02-12 DIAGNOSIS — L97.519: ICD-10-CM

## 2019-02-12 DIAGNOSIS — I49.9: ICD-10-CM

## 2019-02-12 DIAGNOSIS — Z91.048: ICD-10-CM

## 2019-02-12 LAB
ANION GAP SERPL CALC-SCNC: 12.8 MMOL/L (ref 8–16)
BASOPHILS # BLD AUTO: 0 10*3/UL (ref 0–0.1)
BASOPHILS NFR BLD AUTO: 0.5 % (ref 0–1)
BUN SERPL-MCNC: 19 MG/DL (ref 7–26)
BUN/CREAT SERPL: 18 (ref 6–25)
CALCIUM SERPL-MCNC: 9.2 MG/DL (ref 8.4–10.2)
CHLORIDE SERPL-SCNC: 100 MMOL/L (ref 98–107)
CO2 SERPL-SCNC: 27 MMOL/L (ref 22–29)
DEPRECATED NEUTROPHILS # BLD AUTO: 3.1 10*3/UL (ref 2.1–6.9)
EGFRCR SERPLBLD CKD-EPI 2021: > 60 ML/MIN (ref 60–?)
EOSINOPHIL # BLD AUTO: 0.3 10*3/UL (ref 0–0.4)
EOSINOPHIL NFR BLD AUTO: 4.3 % (ref 0–6)
ERYTHROCYTE [DISTWIDTH] IN CORD BLOOD: 14.7 % (ref 11.7–14.4)
GLUCOSE SERPLBLD-MCNC: 102 MG/DL (ref 74–118)
HCT VFR BLD AUTO: 43 % (ref 38.2–49.6)
HGB BLD-MCNC: 14.3 G/DL (ref 14–18)
LYMPHOCYTES # BLD: 1.7 10*3/UL (ref 1–3.2)
LYMPHOCYTES NFR BLD AUTO: 28.8 % (ref 18–39.1)
MCH RBC QN AUTO: 30.8 PG (ref 28–32)
MCHC RBC AUTO-ENTMCNC: 33.3 G/DL (ref 31–35)
MCV RBC AUTO: 92.7 FL (ref 81–99)
MONOCYTES # BLD AUTO: 0.7 10*3/UL (ref 0.2–0.8)
MONOCYTES NFR BLD AUTO: 12.5 % (ref 4.4–11.3)
NEUTS SEG NFR BLD AUTO: 53.6 % (ref 38.7–80)
PLATELET # BLD AUTO: 120 X10E3/UL (ref 140–360)
POTASSIUM SERPL-SCNC: 3.8 MMOL/L (ref 3.5–5.1)
RBC # BLD AUTO: 4.64 X10E6/UL (ref 4.3–5.7)
SODIUM SERPL-SCNC: 136 MMOL/L (ref 136–145)

## 2019-02-12 PROCEDURE — 28315 REMOVAL OF SESAMOID BONE: CPT

## 2019-02-12 PROCEDURE — 88311 DECALCIFY TISSUE: CPT

## 2019-02-12 PROCEDURE — 80048 BASIC METABOLIC PNL TOTAL CA: CPT

## 2019-02-12 PROCEDURE — 88305 TISSUE EXAM BY PATHOLOGIST: CPT

## 2019-02-12 PROCEDURE — 85025 COMPLETE CBC W/AUTO DIFF WBC: CPT

## 2019-02-12 PROCEDURE — 36415 COLL VENOUS BLD VENIPUNCTURE: CPT

## 2019-02-12 PROCEDURE — 88304 TISSUE EXAM BY PATHOLOGIST: CPT

## 2019-02-12 PROCEDURE — 93005 ELECTROCARDIOGRAM TRACING: CPT

## 2019-02-12 NOTE — XMS REPORT
Clinical Summary

                             Created on: 2019



Mecca Fletcher

External Reference #: [id number]

: 1931

Sex: Male



Demographics







                          Address                   [address]

[place]  [address]

 

                          Home Phone                [phone]

 

                          Preferred Language        English

 

                          Marital Status            

 

                          Anabaptist Affiliation     Unknown

 

                          Race                      White

 

                          Ethnic Group              Non-





Author







                          Author                    [facility]

 

                          Organization              [facility]

 

                          Address                   Unknown

 

                          Phone                     Unavailable







Support







                Name            Relationship    Address         Phone

 

                Chris Walden     [employer]            Unknown         [phone]







Care Team Providers







                    Care Team Member Name    Role                Phone

 

                    Dayanna Yu MD    PCP                 [phone]







Allergies







                                        Comments



                 Active Allergy     Reactions       Severity        Noted Date 

 

                                        



Hallucinations, "in orbit"



                     Hydrocodone         Other (See          2016 



                                         Comments)   

 

                                        



Sweating, feeling faint



                           Penicillin G              2016 







Medications







                          End Date                  Status



              Medication     Sig          Dispensed     Refills      Start  



                                         Date  

 

                                                    Active



                     furosemide (LASIX) 20 MG     Take 20 mg by       0   



                           tablet                    mouth daily.     

 

                                                    Active



                     tamsulosin (FLOMAX) 0.4     Take 0.4 mg         0   



                           mg capsule,extended       by mouth     



                           release 24hr              nightly.     

 

                                                    Active



                     allopurinol (ZYLOPRIM)     Take 300 mg         0   



                           300 MG tablet             by mouth     



                                         nightly.     

 

                                                    Active



                     clonIDINE (CATAPRES) 0.1     Take 0.1 mg         0   



                           MG tablet                 by mouth     



                                         nightly.     

 

                          2018                Discontinued



                     losartan (COZAAR) 100 MG     Take 100 mg         0   



                           tablet                    by mouth     



                                         daily.     

 

                          2018                Discontinued



                     carvedilol (COREG) 12.5     Take 12.5 mg        0   



                           MG tablet                 by mouth 2     



                                         (two) times a     



                                         day with     



                                         meals.     

 

                          2018                Discontinued



                     amLODIPine (NORVASC) 5 MG     Take 5 mg by        0   



                           tablet                    mouth daily.     

 

                          2018                Discontinued



                     cranberry conc-ascorbic     Take 2              0   



                           acid 4,200-20 mg capsule     capsules by     



                                         mouth daily.     

 

                          2018                Discontinued



                     celecoxib (CeleBREX) 200     Take 200 mg         0   



                           MG capsule                by mouth 2     



                                         (two) times a     



                                         day.     

 

                          10/04/2018                



              carvedilol (COREG) 25 MG     Take 1 tablet     60 tablet     2              



                     tablet              (25 mg total)       8  



                                         by mouth 2     



                                         (two) times a     



                                         day with     



                                         meals for 30     



                                         days.     

 

                          10/01/2018                



              cefuroxime (CEFTIN) 250     Take 2       56 tablet     0              



                     MG tablet           tablets (500        8  



                                         mg total) by     



                                         mouth 2 (two)     



                                         times a day     



                                         for 14 days.     







Active Problems







 



                           Problem                   Noted Date

 

 



                           Abscess of right foot     2018

 

 



                           Leg edema, right          2018

 

 



                           Unstable angina           2018

 

 



                           Bradycardia               2016







Encounters







                          Care Team                 Description



                     Date                Type                Specialty  

 

                                        



Adi Clemente MD Vinh, Cezar MARRUFO MD                        Leg edema, right (Primary Dx); 

Abscess of right foot



                     2018          Hospital            General Internal Medicine  



                           -                         Encounter   



                                         2018    

 

                                        



Law Gaytan MD Nguyen, Kierra Nava MD                  Unstable angina (Primary Dx)



                     2018          Emergency           General Internal Medicine  



                                         -    



                                         2018    



after 2018



Family History







   



                 Medical History     Relation        Name            Comments

 

   



                           Cancer                    Brother  

 

   



                           Cancer                    Father  









   



                 Relation        Name            Status          Comments

 

   



                                         Brother   

 

   



                                         Father   







Social History







                                        Date



                 Tobacco Use     Types           Packs/Day       Years Used 

 

                                         



                                         Never Smoker    

 

    



                                         Smokeless Tobacco: Never   



                                         Used   









   



                 Alcohol Use     Drinks/Week     oz/Week         Comments

 

   



                     Yes                 1-2 Standard        0.6 - 1.2 



                                         drinks or  



                                         equivalent  









 



                           Sex Assigned at Birth     Date Recorded

 

 



                                         Not on file 









                                        Industry



                           Job Start Date            Occupation 

 

                                        Not on file



                           Not on file               Not on file 









                                        Travel End



                           Travel History            Travel Start 

 





                                         No recent travel history available.







Last Filed Vital Signs







                                        Time Taken



                           Vital Sign                Reading 

 

                                        2018  8:49 AM CDT



                           Blood Pressure            150/75 

 

                                        2018  8:49 AM CDT



                           Pulse                     71 

 

                                        2018  8:49 AM CDT



                           Temperature               36.4 C (97.6 F) 

 

                                        2018  8:49 AM CDT



                           Respiratory Rate          18 

 

                                        2018  8:49 AM CDT



                           Oxygen Saturation         93% 

 

                                        -



                           Inhaled Oxygen            - 



                                         Concentration  

 

                                        2018  8:53 AM CDT



                           Weight                    92.6 kg (204 lb 3 oz) 

 

                                        2018  8:53 AM CDT



                           Height                    190.5 cm (6' 3") 

 

                                        2018  8:53 AM CDT



                           Body Mass Index           25.52 







Plan of Treatment







   



                 Health Maintenance     Due Date        Last Done       Comments

 

   



                           SHINGLES VACCINES (1 of     1981  



                                         2)   

 

   



                           PNEUMOCOCCAL              1996  



                                         POLYSACCHARIDE VACCINE   



                                         AGE 65 AND OVER   

 

   



                           PNEUMOCOCCAL-13           1996  

 

   



                           INFLUENZA VACCINE         2018  







Implants







                    Device Identifier    Shelf Expiration Date    Model / Serial / Lot



                 Implanted       Type            Area            Manufactur   



                                         er   

 

                                        2018          350 975 /

                                        [id number] /

                                        [id number]



                 Setrox S Steroid-Eluting Bipolar     IPM             N/A: N/A        BIOTRONIK,   



                     Implantable Endocardial Bradycardia     IMPLANT             INC.   



                           Lead With Active Fixation     DEVICES     



                                         Electrically Active Helix 1.8mm      



                                         Connector Is-1 Straight Ring      



                                         Electrode Insulation Silicon Rubber      



                                         6.6fr 6.7fr Steroid [place]      



                                         Setrox S 60 10mm 45/53/60cm -      



                                         [id number]      



                                         Implanted: 2016 (Quantity not      



                                         on file)      

 

                                        2018          350 974 /

                                        [id number] /

                                        [id number]



                 Setrox S Steroid-Eluting Bipolar     IPM             N/A: N/A        BIOTRONIK,   



                     Implantable Endocardial Bradycardia     IMPLANT             INC.   



                           Lead With Active Fixation     DEVICES     



                                         Electrically Active Helix 1.8mm      



                                         Connector Is-1 Straight Ring      



                                         Electrode Insulation Silicon Rubber      



                                         6.6fr 6.7fr Steroid [place]      



                                         Setrox S 53 10mm 45/53/60cm -      



                                         [id number]      



                                         Implanted: 2016 (Quantity not      



                                         on file)      

 

                                        2017          [id number] /

                                        [id number] /

                                        [id number]



                 Mery Aguirre With Cls With     IPM             N/A: N/A        BIOTRONIK,   



                     Home Monitoring - [id number] -     PACEMAKERS          INC.   



                                         [id number]      



                                         Implanted: Qty: 1 on 2016 by      



                                         Scott Anderson MD      

 

                                                            SS7 /

 /





                     Safesheath2 - [id number]     IPM                 [employer]   



                     Implanted: 2016 (Quantity not     SUPPLIES            [employer] [place],   



                     on file)            PATIENT             INC.   



                                         BILLABLE     

 

                                                            SS7 /

 /





                 Safesheath2 - [id number]     IPM             N/A: N/A        MEDTRONIC   



                     Implanted: 2016 (Quantity not     SUPPLIES            [employer],   



                     on file)            PATIENT             INC.   



                                         BILLABLE     

 

                                                            SS7 /

 /





                 Safesheath2 - [id number]     IPM             N/A: N/A        [employer]TRONIC   



                     Implanted: 2016 (Quantity not     SUPPLIES            [employer] [place],   



                     on file)            PATIENT             INC.   



                                         BILLABLE     

 

                                                            [id number] /

                                        [id number] /





                 Pacemaker-2016     Pacemaker       Left:           BIOTRONIK,   



                     Implanted: 2016 by Carmelo Anderson MD (Quantity not on      



                                         file)      







Procedures







                                        Comments



                 Procedure Name     Priority        Date/Time       Associated Diagnosis 

 

                                        



Results for this procedure are in the results section.



                     NM BONE SCAN 3 PHASE     STAT                2018  



                                         2:10 PM CDT  

 

                                        



Results for this procedure are in the results section.



                     ESTIMATED GFR       Routine             2018  



                                         4:00 AM CDT  

 

                                        



Results for this procedure are in the results section.



                     HC COMPLETE BLD COUNT     Routine             2018  



                           W/AUTO DIFF               4:00 AM CDT  

 

                                        



Results for this procedure are in the results section.



                     BASIC METABOLIC PANEL     Routine             2018  



                                         4:00 AM CDT  

 

                                        



Results for this procedure are in the results section.



                     VANCOMYCIN LEVEL, TROUGH     Timed               09/15/2018  



                                         11:34 AM CDT  

 

                                        



Results for this procedure are in the results section.



                     HC COMPLETE BLD COUNT     Routine             09/15/2018  



                           W/AUTO DIFF               4:30 AM CDT  

 

                                        



Results for this procedure are in the results section.



                     ESTIMATED GFR       Routine             09/15/2018  



                                         4:00 AM CDT  

 

                                        



Results for this procedure are in the results section.



                     THYROID STIMULATING     Routine             09/15/2018  



                           HORMONE                   4:00 AM CDT  

 

                                        



Results for this procedure are in the results section.



                     T4, FREE            Routine             09/15/2018  



                                         4:00 AM CDT  

 

                                        



Results for this procedure are in the results section.



                     MAGNESIUM LEVEL     Routine             09/15/2018  



                                         4:00 AM CDT  

 

                                        



Results for this procedure are in the results section.



                     BASIC METABOLIC PANEL     Routine             09/15/2018  



                                         4:00 AM CDT  

 

                                        



Results for this procedure are in the results section.



                     AFB STAIN           Routine             2018  



                                         7:59 PM CDT  

 

                                        



Results for this procedure are in the results section.



                     GRAM STAIN          Routine             2018  



                                         7:59 PM CDT  

 

                                        



Results for this procedure are in the results section.



                     FUNGUS SMEAR        Routine             2018  



                                         7:59 PM CDT  

 

                                        



Results for this procedure are in the results section.



                     FUNGUS CULTURE      Routine             2018  



                                         7:59 PM CDT  

 

                                        



Results for this procedure are in the results section.



                     ANAEROBIC CULTURE     Routine             2018  



                                         7:59 PM CDT  

 

                                        



Results for this procedure are in the results section.



                     AFB CULTURE         Routine             2018  



                                         7:59 PM CDT  

 

                                        



Results for this procedure are in the results section.



                     AEROBIC CULTURE     Routine             2018  



                                         7:59 PM CDT  

 

                                        



Results for this procedure are in the results section.



                     ESTIMATED GFR       Routine             2018  



                                         4:00 AM CDT  

 

                                        



Results for this procedure are in the results section.



                     BASIC METABOLIC PANEL     Routine             2018  



                                         4:00 AM CDT  

 

                                        



Results for this procedure are in the results section.



                     HC COMPLETE BLD COUNT     Routine             2018  



                           W/AUTO DIFF               3:45 AM CDT  

 

                                        



Results for this procedure are in the results section.



                     XR FOOT 2 VW RIGHT     STAT                2018  



                                         10:38 PM CDT  

 

                                        



Results for this procedure are in the results section.



                     BLOOD CULTURE, AEROBIC &     Routine             2018  



                           ANAEROBIC                 10:27 PM CDT  

 

                                        



Results for this procedure are in the results section.



                     LACTIC ACID LEVEL, SEPSIS     Timed               2018  



                           - NOW AND REPEAT 2X EVERY      9:30 PM CDT  



                                         3 HOURS    

 

                                        



Results for this procedure are in the results section.



                     US DUPLEX VENOUS LOWER     STAT                2018  



                           EXTREMITY RIGHT           2:20 PM CDT  

 

                                        



Results for this procedure are in the results section.



                     ESTIMATED GFR       STAT                2018  



                                         12:50 PM CDT  

 

                                        



Results for this procedure are in the results section.



                     LACTIC ACID LEVEL, SEPSIS     STAT                2018  



                           - NOW AND REPEAT 2X EVERY      12:50 PM CDT  



                                         3 HOURS    

 

                                        



Results for this procedure are in the results section.



                     COMPREHENSIVE METABOLIC     STAT                2018  



                           PANEL                     12:50 PM CDT  

 

                                        



Results for this procedure are in the results section.



                     PARTIAL THROMBOPLASTIN     STAT                2018  



                           TIME (PTT)                12:50 PM CDT  

 

                                        



Results for this procedure are in the results section.



                     PROTHROMBIN TIME WITH INR     STAT                2018  



                                         12:50 PM CDT  

 

                                        



Results for this procedure are in the results section.



                     HC COMPLETE BLD COUNT     STAT                2018  



                           W/AUTO DIFF               12:50 PM CDT  

 

                                        



Results for this procedure are in the results section.



                     BLOOD CULTURE, AEROBIC &     Routine             2018  



                           ANAEROBIC                 11:30 AM CDT  

 

                                        



Results for this procedure are in the results section.



                     ECHOCARDIOGRAM 2D     Routine             2018  



                           COMPLETE W MMODE SPECTRAL      1:45 PM CDT  



                                         COLOR DOPPLER ([id number])    

 

                                        



Results for this procedure are in the results section.



                     TROPONIN            Timed               2018  



                                         10:36 AM CDT  

 

                                        



Results for this procedure are in the results section.



                     US DUPLEX VENOUS LOWER     STAT                2018  



                           EXTREMITY BILATERAL       9:03 AM CDT  

 

                                        



Results for this procedure are in the results section.



                     XR CHEST 1 VW PORTABLE     STAT                2018  



                                         7:54 AM CDT  

 

                                        



Results for this procedure are in the results section.



                     ZZESTIMATED GFR     STAT                2018  



                                         7:18 AM CDT  

 

                                        



Results for this procedure are in the results section.



                     B NATRIURETIC PEPTIDE     STAT                2018  



                                         7:18 AM CDT  

 

                                        



Results for this procedure are in the results section.



                     TROPONIN            STAT                2018  



                                         7:18 AM CDT  

 

                                        



Results for this procedure are in the results section.



                     COMPREHENSIVE METABOLIC     STAT                2018  



                           PANEL                     7:18 AM CDT  

 

                                        



Results for this procedure are in the results section.



                     HC COMPLETE BLD COUNT     STAT                2018  



                           W/AUTO DIFF               7:18 AM CDT  

 

                                        



Results for this procedure are in the results section.



                     ECG ED PRELIMINARY     Routine             2018  



                           INTERPRETATION            7:12 AM CDT  

 

                                        



Results for this procedure are in the results section.



                     ECG 12-LEAD         STAT                2018  



                                         7:01 AM CDT  



after 2018



Results

* NM Bone Scan 3 Phase (2018  2:10 PM CDT)





 



                           Narrative                 Performed At

 

 



                           PROCEDURE:NM BONE SCAN 3 PHASE      [facility]



                                         CLINICAL HISTORY:Osteomyelitisknownfootfollow up, RIGHT foot 



                                         sub-first metatarsal head ulcerationprobing to bone 



                                         COMPARISON:No prior bone scans.; Right foot x-rays 2018 



                                         TECHNIQUE: 



                                         25 millicuries of technetium-99m-MDP were administered IV, followed by blood 



                                         flow and blood pool imaging of the feet. 3-5 hours later, whole-body scanning in

 



                                         the anterior and posterior projectionswas performed. 



                                         FINDINGS: 



                                         Moderate hyperemia is present throughout most of the right foot on the blood 



                                         flow and blood pool phases, minimally more prominent near the right first MTP 



                                         joint. Delayed imaging demonstrates mild uptake bilaterally in the first MTP 



                                         joint, right greater 



                                         the left, with even higher uptake present focally in the lateral right distal 



                                         tarsal region. 



                                         Whole-body imaging demonstrates extensive degenerative uptake: marked uptake 



                                         bilaterally in L3 and L4, along with moderate to marked uptake in the left knee

 



                                         and moderately in the right knee. Mild uptake is present in the lower 



                                         cervical/upper thoracic 



                                         spine and sternoclavicular junctions. 



                                         IMPRESSION: 



                                         1.Findings favor degenerative joint disease and hyperemia from soft tissue 





                                         inflammation near the right first MTP joint. However, it is not possible to 



                                         exclude the less likely possibilities of focal osteomyelitis or a septic joint.

 



                                         In this location near 



                                         a joint, MRI would be a more accurate imaging modality. 



                                         2.Multiple foci of uptake elsewhere in the feet are likely degenerative. 



                                         Degenerative disease is also seen elsewhere in the body. 



                                         [id number] 









                                        Procedure Note

 

                                        



Hm Interface, Radiology Results Incoming - 2018  2:34 PM CDT



PROCEDURE:  NM BONE SCAN 3 PHASE



CLINICAL HISTORY:  Osteomyelitis  known  foot  follow up, RIGHT foot sub-first 
metatarsal head ulceration  probing to bone



COMPARISON:  No prior bone scans.; Right foot x-rays 2018



TECHNIQUE: 

                                        25 millicuries of technetium-99m-MDP were administered IV, followed by blood flow

 and blood pool imaging of the feet. 3-5 hours later, whole-body scanning in the
anterior and posterior projections  was performed.



FINDINGS:

Moderate hyperemia is present throughout most of the right foot on the blood 
flow and blood pool phases, minimally more prominent near the right first MTP 
joint. Delayed imaging demonstrates mild uptake bilaterally in the first MTP 
joint, right greater 

the left, with even higher uptake present focally in the lateral right distal 
tarsal region.



Whole-body imaging demonstrates extensive degenerative uptake: marked uptake 
bilaterally in L3 and L4, along with moderate to marked uptake in the left knee 
and moderately in the right knee. Mild uptake is present in the lower 
cervical/upper thoracic 

spine and sternoclavicular junctions.

 

IMPRESSION:



                                        1.  Findings favor degenerative joint disease and hyperemia from soft tissue inflammation

 near the right first MTP joint. However, it is not possible to exclude the less
likely possibilities of focal osteomyelitis or a septic joint. In this location 
near

 a joint, MRI would be a more accurate imaging modality.



                                        2.  Multiple foci of uptake elsewhere in the feet are likely degenerative. Degenerative

 disease is also seen elsewhere in the body.







[facility]-[id number]











   



                 Performing Organization     Address         City[facility]     Phone Number

 

   



                     [facility]          [address] [place] [address] 





* Estimated GFR (2018  4:00 AM CDT)



Only the most recent of 4 results within the time period is included.





   



                 Component       Value           Ref Range       Performed At

 

   



                 Estimated GFR     70              mL/min/1.73 m2     [facility] DEPARTMENT OF



                           Comment:                  PATHOLOGY AND



                           CatergoryUnitsInte      GENOMIC MEDICINE



                                         rpretation  



                                         G1  



                                         >=90 Normal or high  



                                         G2  



                                         60-89Mildly decreased  



                                         [id number]  



                                         Mildly to moderately  



                                         decreased  



                                         [id number]  



                                         Moderately to severely  



                                         decreased  



                                         G4  



                                         15-29Severely  



                                         decreased  



                                         G5  



                                         <15Kidney failure  



                                         The eGFR was calculated using  



                                         the Chronic Kidney Disease  



                                         Epidemiology Collaboration  



                                         (CKD-EPI) equation.  



                                         Interpretation is based on  



                                         recommendations of the  



                                         National Kidney  



                                         Foundation-Kidney Disease  



                                         Outcomes Quality  



                                         Initiative (NKF-KDOQI)  



                                         published in 2014.  













                                         Specimen

 





                                         Plasma specimen









   



                 Performing Organization     Address         City/[facility]     Phone Number

 

   



                     [facility] DEPARTMENT OF     [address] [place] [address] 



                                         PATHOLOGY AND GENOMIC   



                                         MEDICINE   





* CBC with platelet and differential (2018  4:00 AM CDT)



Only the most recent of 5 results within the time period is included.





   



                 Component       Value           Ref Range       Performed At

 

   



                 WBC             6.52            4.50 - 11.00 k/uL     [facility] DEPARTMENT OF



                                         PATHOLOGY AND



                                         GENOMIC MEDICINE

 

   



                 RBC             4.30 (L)        4.40 - 6.00 m/uL     [facility] DEPARTMENT OF



                                         PATHOLOGY AND



                                         GENOMIC MEDICINE

 

   



                 HGB             13.7 (L)        14.0 - 18.0 g/dL     [facility] DEPARTMENT OF



                                         PATHOLOGY AND



                                         GENOMIC MEDICINE

 

   



                 HCT             40.7 (L)        41.0 - 51.0 %     [facility] DEPARTMENT OF



                                         PATHOLOGY AND



                                         GENOMIC MEDICINE

 

   



                 MCV             94.7            82.0 - 100.0 fL     [facility] DEPARTMENT OF



                                         PATHOLOGY AND



                                         GENOMIC MEDICINE

 

   



                 MCH             31.9            27.0 - 34.0 pg     [facility] DEPARTMENT OF



                                         PATHOLOGY AND



                                         GENOMIC MEDICINE

 

   



                 MCHC            33.7            31.0 - 37.0 g/dL     [facility] DEPARTMENT OF



                                         PATHOLOGY AND



                                         GENOMIC MEDICINE

 

   



                 RDW - SD        50.7            37.0 - 55.0 fL     [facility] DEPARTMENT OF



                                         PATHOLOGY AND



                                         GENOMIC MEDICINE

 

   



                 MPV             9.4             8.8 - 13.2 fL     [facility] DEPARTMENT OF



                                         PATHOLOGY AND



                                         GENOMIC MEDICINE

 

   



                 Platelet count     156             150 - 400 k/uL     [facility] DEPARTMENT OF



                                         PATHOLOGY AND



                                         GENOMIC MEDICINE

 

   



                 Nucleated RBC     0.00            /100 WBC        [facility] DEPARTMENT OF



                                         PATHOLOGY AND



                                         GENOMIC MEDICINE

 

   



                 Neutrophils     50.6            39.0 - 69.0 %     [facility] DEPARTMENT OF



                                         PATHOLOGY AND



                                         GENOMIC MEDICINE

 

   



                 Lymphocytes     33.9            25.0 - 45.0 %     [facility] DEPARTMENT OF



                                         PATHOLOGY AND



                                         GENOMIC MEDICINE

 

   



                 Monocytes       10.1 (H)        0.0 - 10.0 %     [facility] DEPARTMENT OF



                                         PATHOLOGY AND



                                         GENOMIC MEDICINE

 

   



                 Eosinophils     4.3             0.0 - 5.0 %     [facility] DEPARTMENT OF



                                         PATHOLOGY AND



                                         GENOMIC MEDICINE

 

   



                 Basophils       0.6             0.0 - 1.0 %     [facility] DEPARTMENT OF



                                         PATHOLOGY AND



                                         GENOMIC MEDICINE

 

   



                 Immature granulocytes     0.5Comment: "Immature     0.0 - 1.0 %     [facility] DEPARTMENT OF





                           granulocytes"  (promyelocytes,      PATHOLOGY AND



                           myelocytes, metamyelocytes)      GENOMIC MEDICINE













                                         Specimen

 





                                         Blood









   



                 Performing Organization     Address         City/State/Zipcode     Phone Number

 

   



                     [facility] DEPARTMENT OF     [address] [place] [address] 



                                         PATHOLOGY AND GENOMIC   



                                         MEDICINE   





* Basic metabolic panel (2018  4:00 AM CDT)



Only the most recent of 3 results within the time period is included.





   



                 Component       Value           Ref Range       Performed At

 

   



                 Sodium          139             135 - 148 mEq/L     [facility] DEPARTMENT OF



                                         PATHOLOGY AND



                                         GENOMIC MEDICINE

 

   



                 Potassium       3.7             3.5 - 5.0 mEq/L     [facility] DEPARTMENT OF



                                         PATHOLOGY AND



                                         GENOMIC MEDICINE

 

   



                 Chloride        98              98 - 112 mEq/L     [facility] DEPARTMENT OF



                                         PATHOLOGY AND



                                         GENOMIC MEDICINE

 

   



                 CO2             28              24 - 31 mEq/L     [facility] DEPARTMENT OF



                                         PATHOLOGY AND



                                         GENOMIC MEDICINE

 

   



                 Anion gap       13@ANIO         7 - 15 mEq/L     [facility] DEPARTMENT OF



                                         PATHOLOGY AND



                                         GENOMIC MEDICINE

 

   



                 BUN             17              8 - 23 mg/dL     [facility] DEPARTMENT OF



                                         PATHOLOGY AND



                                         GENOMIC MEDICINE

 

   



                 Creatinine      0.97            0.70 - 1.20 mg/dL     [facility] DEPARTMENT OF



                                         PATHOLOGY AND



                                         GENOMIC MEDICINE

 

   



                 Glucose         89              65 - 99 mg/dL     [facility] DEPARTMENT OF



                                         PATHOLOGY AND



                                         GENOMIC MEDICINE

 

   



                 Calcium         8.7 (L)         8.8 - 10.2 mg/dL     [facility] DEPARTMENT OF



                                         PATHOLOGY AND



                                         GENOMIC MEDICINE













                                         Specimen

 





                                         Plasma specimen









   



                 Performing Organization     Address         City[facility]     Phone Number

 

   



                     [place] [address] 



                                         PATHOLOGY AND GENOMIC   



                                         MEDICINE   





* Vancomycin level, trough (09/15/2018 11:34 AM CDT)





   



                 Component       Value           Ref Range       Performed At

 

   



                 Vancomycin, trough     17.5            10.0 - 20.0 ug/mL     [facility] DEPARTMENT OF



                           Comment:                  PATHOLOGY AND



                           Therapeutic Ranges:       GENOMIC MEDICINE



                                         Peak 30.0 -  



                                         40.0 ug/mL  



                                         Fhmcxb05.0 -  



                                         20.0 ug/mL  













                                         Specimen

 





                                         Serum









   



                 Performing Organization     Address         City[facility]     Phone Number

 

   



                     [place] [address] 



                                         PATHOLOGY AND [facility]   



                                         MEDICINE   





* Thyroid stimulating hormone (09/15/2018  4:00 AM CDT)





   



                 Component       Value           Ref Range       Performed At

 

   



                 TSH             3.94            0.27 - 4.20 uIU/mL     [facility] DEPARTMENT OF



                                         PATHOLOGY AND



                                         GENOMIC MEDICINE













                                         Specimen

 





                                         Plasma specimen









   



                 Performing Organization     Address         City[facility]     Phone Number

 

   



                     [place] [address] 



                                         PATHOLOGY AND GENOMIC   



                                         MEDICINE   





* T4, free (09/15/2018  4:00 AM CDT)





   



                 Component       Value           Ref Range       Performed At

 

   



                 T4, free        1.2             0.9 - 1.7 ng/dL     [facility] DEPARTMENT OF



                                         PATHOLOGY AND



                                         GENOMIC MEDICINE













                                         Specimen

 





                                         Plasma specimen









   



                 Performing Organization     Address         [facility]     Phone Number

 

   



                     [place] [address] 



                                         PATHOLOGY AND [facility]   



                                         MEDICINE   





* Magnesium level (09/15/2018  4:00 AM CDT)





   



                 Component       Value           Ref Range       Performed At

 

   



                 Magnesium       2.0             1.6 - 2.4 mg/dL     [facility] DEPARTMENT OF



                                         PATHOLOGY AND



                                         GENOMIC MEDICINE













                                         Specimen

 





                                         Plasma specimen









   



                 Performing Organization     Address         [facility]     Phone Number

 

   



                     [facility] DEPARTMENT OF     [address] [place] [id number] 



                                         PATHOLOGY AND GENOMIC   



                                         MEDICINE   





* Fungus smear (2018  7:59 PM CDT)





   



                 Component       Value           Ref Range       Performed At

 

   



                     Fungus smear        No fungi observed.      [facility] DEPARTMENT OF



                           Comment:                  PATHOLOGY AND



                           Specimen Information      GENOMIC MEDICINE



                                         Specimen Source: Wound  



                                         Specimen Site: Foot  













                                         Specimen

 





                                         Wound - Foot









   



                 Performing Organization     Address         City[facility]     Phone Number

 

   



                     [facility] DEPARTMENT OF     [address] [place] [address] 



                                         PATHOLOGY AND GENOMIC   



                                         MEDICINE   





* AFB culture (2018  7:59 PM CDT)





   



                 Component       Value           Ref Range       Performed At

 

   



                     AFB culture isolate     No growth after 6 weeks of      [facility] DEPARTMENT OF



                           incubation.               PATHOLOGY AND



                           Comment:                  GENOMIC MEDICINE



                                         Specimen Information  



                                         Specimen Source: Wound  



                                         Specimen Site: Foot  













                                         Specimen

 





                                         Wound - Foot









   



                 Performing Organization     Address         City/[facility]     Phone Number

 

   



                     [facility] DEPARTMENT OF     [address] [place] [address] 



                                         PATHOLOGY AND GENOMIC   



                                         MEDICINE   





* Aerobic culture (2018  7:59 PM CDT)





   



                 Component       Value           Ref Range       Performed At

 

   



                     Aerobic culture isolate     Escherichia coli      [facility] DEPARTMENT OF



                           Moderate                  PATHOLOGY AND



                           , susceptibility to follow      GENOMIC MEDICINE



                                         This organism is NOT a  



                                         carbapenemase producing  



                                         organism.  



                                         (A)  



                                         Comment:  



                                         Specimen Information  



                                         Specimen Source: Wound  



                                         Specimen Site: Foot  

 

   



                     Aerobic culture isolate     Enterococcus faecalis      [facility] DEPARTMENT OF



                           Moderate                  PATHOLOGY AND



                           susceptibility to follow      GENOMIC MEDICINE



                                         Enterococcus resistant to high  



                                         levels of Gentamicin.  



                                         Susceptibility results do not  



                                         indicate synergy with  



                                         Penicillins and Vancomycin.  



                                         This organism is Vancomycin  



                                         Sensitive.  



                                         (A)  













                                         Specimen

 





                                         Wound - Foot









                    Antibiotic          Method              Susceptibility



                                         Organism   

 

                    Ampicillin          MARIEL                 



>16 mcg/mL: Resistant



                                         Escherichia coli   

 

                    Amoxicillin/Clavulanate    MARIEL                 



4/2 mcg/mL: Susceptible



                                         Escherichia coli   

 

                    Amikacin            MARIEL                 



8 mcg/mL: Susceptible



                                         Escherichia coli   

 

                    Aztreonam           MARIEL                 



<=1 mcg/mL: Susceptible



                                         Escherichia coli   

 

                    Ceftazidime         MARIEL                 



<=0.5 mcg/mL: Susceptible



                                         Escherichia coli   

 

                    Ciprofloxacin       MARIEL                 



>2 mcg/mL: Resistant



                                         Escherichia coli   

 

                    Ceftriaxone         MARIEL                 



<=0.5 mcg/mL: Susceptible



                                         Escherichia coli   

 

                    Cefuroxime Sodium    MARIEL                 



<=4 mcg/mL: Susceptible



                                         Escherichia coli   

 

                    Cefazolin           MARIEL                 



8 mcg/mL: Resistant



                                         Escherichia coli   

 

                    Cefepime            MARIEL                 



<=0.5 mcg/mL: Susceptible



                                         Escherichia coli   

 

                    Cefoxitin           MARIEL                 



<=4 mcg/mL: Susceptible



                                         Escherichia coli   

 

                    Gentamicin          MARIEL                 



>8 mcg/mL: Resistant



                                         Escherichia coli   

 

                    Imipenem            MARIEL                 



8 mcg/mL: Resistant



                                         Escherichia coli   

 

                    Levofloxacin        MARIEL                 



>4 mcg/mL: Resistant



                                         Escherichia coli   

 

                    Meropenem           MARIEL                 



2 mcg/mL: Resistant



                                         Escherichia coli   

 

                    Tobramycin          MARIEL                 



8 mcg/mL: Resistant



                                         Escherichia coli   

 

                    Ampicillin/Sulbactam    MARIEL                 



>16/8 mcg/mL: Resistant



                                         Escherichia coli   

 

                    Trimethoprim/Sulfamethoxazole    MARIEL                 



>2/38 mcg/mL: Resistant



                                         Escherichia coli   

 

                    Tetracycline        MARIEL                 



>8 mcg/mL: Resistant



                                         Escherichia coli   

 

                    Piperacillin/Tazobactam    MARIEL                 



<=2/4 mcg/mL: Susceptible



                                         Escherichia coli   

 

                    Ertapenem           MARIEL                 



0.5 mcg/mL: Susceptible



                                         Escherichia coli   

 

                    Tigecycline         MARIEL                 



4 mcg/mL: Resistant



                                         Escherichia coli   

 

                    Ampicillin          MARIEL                 



2 mcg/mL: Susceptible



                                         Enterococcus faecalis   

 

                    Erythromycin        MARIEL                 



4 mcg/mL: Resistant



                                         Enterococcus faecalis   

 

                    Gentamicin-Syn      MARIEL                 



>500 mcg/mL: Resistant



                                         Enterococcus faecalis   

 

                    Linezolid           MARIEL                 



2 mcg/mL: Susceptible



                                         Enterococcus faecalis   

 

                    Minocycline         MARIEL                 



8 mcg/mL: Resistant



                                         Enterococcus faecalis   

 

                    Vancomycin          MARIEL                 



1 mcg/mL: Susceptible



                                         Enterococcus faecalis   









   



                 Performing Organization     Address         City/[facility]     Phone Number

 

   



                     [facility] DEPARTMENT OF     [address] [place] [address] 



                                         PATHOLOGY AND GENOMIC   



                                         MEDICINE   





* Gram stain (2018  7:59 PM CDT)





   



                 Component       Value           Ref Range       Performed At

 

   



                     Gram stain isolate     Occasional WBC's      [facility] DEPARTMENT OF



                           Moderate Gram negative rods      PATHOLOGY AND



                           Many Gram positive cocci in      GENOMIC MEDICINE



                                         pairs  



                                         Comment:  



                                         Specimen Information  



                                         Specimen Source: Wound  



                                         Specimen Site: Foot  













                                         Specimen

 





                                         Wound - Foot









   



                 Performing Organization     Address         City/[facility]     Phone Number

 

   



                     [facility] [place] [address] 



                                         PATHOLOGY AND GENOMIC   



                                         MEDICINE   





* AFB stain (2018  7:59 PM CDT)





   



                 Component       Value           Ref Range       Performed At

 

   



                     AFB stain           No acid fast bacilli (AFB)      [facility] DEPARTMENT OF



                           seen.                     PATHOLOGY AND



                           Comment:                  GENOMIC MEDICINE



                                         Specimen Information  



                                         Specimen Source: Wound  



                                         Specimen Site: Foot  













                                         Specimen

 





                                         Wound - Foot









   



                 Performing Organization     Address         City/[facility]     Phone Number

 

   



                     [facility] DEPARTMENT OF     [address] [place] [address] 



                                         PATHOLOGY AND GENOMIC   



                                         MEDICINE   





* Fungus culture (2018  7:59 PM CDT)





   



                 Component       Value           Ref Range       Performed At

 

   



                     Fungus culture isolate     No growth after 4 weeks of      [facility] DEPARTMENT OF



                           incubation.               PATHOLOGY AND



                           Comment:                  GENOMIC MEDICINE



                                         Specimen Information  



                                         Specimen Source: Wound  



                                         Specimen Site: Foot  













                                         Specimen

 





                                         Wound - Foot









   



                 Performing Organization     Address         City/[facility]/Zipcode     Phone Number

 

   



                     [facility] DEPARTMENT [address] [address] 



                                         PATHOLOGY AND GENOMIC   



                                         MEDICINE   





* Anaerobic culture (2018  7:59 PM CDT)





   



                 Component       Value           Ref Range       Performed At

 

   



                     Anaerobic culture isolate     No anaerobic organisms      [facility] DEPARTMENT OF



                           isolated.                 PATHOLOGY AND



                           Comment:                  GENOMIC MEDICINE



                                         Specimen Information  



                                         Specimen Source: Wound  



                                         Specimen Site: Foot  













                                         Specimen

 





                                         Wound - Foot









   



                 Performing Organization     Address         [facility]     Phone Number

 

   



                     [facility] [place] [address] 



                                         PATHOLOGY AND GENOMIC   



                                         MEDICINE   





* XR Foot 2 Vw Right (2018 10:38 PM CDT)





 



                           Narrative                 Performed At

 

 



                           EXAMINATION:XR FOOT 2 VW RIGHT      [facility]



                                         CLINICAL HISTORY:Osteomyelitis suspectedfoot swellingdiabetic 



                                         COMPARISON:None. 



                                         IMPRESSION: 



                                         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 



                                         Degenerative interphalangeal changes. 



                                         No acute displaced fracture. Plantar calcaneal spur and distal Achilles 



                                         enthesophyte. 



                                         [facility]-[id number] 









                                        Procedure Note

 

                                        



Hm Interface, Radiology Results Incoming - 2018 11:31 PM CDT



EXAMINATION:  XR FOOT 2 VW RIGHT



CLINICAL HISTORY:  Osteomyelitis suspected  foot swelling  diabetic



COMPARISON:  None.



IMPRESSION:



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



Degenerative interphalangeal changes.



No acute displaced fracture. Plantar calcaneal spur and distal Achilles 
enthesophyte.





[facility]-[id number]











   



                 Performing Organization     Address         City[facility]     Phone Number

 

   



                      [facility]          [address] [place] [address] 





* Blood culture, aerobic & anaerobic (2018 10:27 PM CDT)



Only the most recent of 2 results within the time period is included.





   



                 Component       Value           Ref Range       Performed At

 

   



                     Blood culture isolate     No growth after 5 days of      [facility] DEPARTMENT OF



                           incubation.               PATHOLOGY AND



                           Comment:                  GENOMIC MEDICINE



                                         Specimen Information  



                                         Specimen Source: Blood  



                                         Specimen Site: Antecubital,  



                                         left  













                                         Specimen

 





                                         Blood - Antecubital, left









   



                 Performing Organization     Address         City/State/Zipcode     Phone Number

 

   



                     [facility] DEPARTMENT [address] [address] 



                                         PATHOLOGY AND GENOMIC   



                                         MEDICINE   





* Lactic acid level, SEPSIS - Now and repeat 2x every 3 hours (2018  9:30 
  PM CDT)



Only the most recent of 2 results within the time period is included.





   



                 Component       Value           Ref Range       Performed At

 

   



                 Lactic acid     1.5             0.5 - 2.2 mmol/L     [facility] DEPARTMENT OF



                                         PATHOLOGY AND



                                         GENOMIC MEDICINE













                                         Specimen

 





                                         Blood









   



                 Performing Organization     Address         City/State/Zipcode     Phone Number

 

   



                     [facility] DEPARTMENT OF     [address] [place] [address] 



                                         PATHOLOGY AND GENOMIC   



                                         MEDICINE   





* PV Duplex Venous Lower Extremity (2018  2:20 PM CDT)



Only the most recent of 2 results within the time period is included.





 



                           Narrative                 Performed At

 

 



                                [facility]





                                         Vascular Ultrasound Laboratory 



                                         Lower Extremity Venous Report 



                                          [address] [facility] [facility] [address], [place] [address] 



                                          



                                         Pat.Name:MECCA FLETCHER 



                                         Pat.ID:[id number] 



                                         .Date: 2018 Refer.MD:PHYSICIAN, 



                                         EMERGENCY, MD 



                                         Exam Time: 2:01:00 PMStudy Type:LE 



                                         Venous 



                                         DOBAge:1931,87Y Sex: 



                                         MALE 



                                         Sonogrphr: MAYELIN Vitale RCS Pat. 



                                         Stat.:Outpatient 



                                         Room:[facility] EDP TapeVol: 



                                         GO, 



                                         CPT - 4: 75808 Echo Event 



                                         ID:[id number] 



                                         Order ID:[id number] 



                                         Reason for Study:Right leg edema , evaluate for DVT. 



                                         Procedures:Colorflow, Grayscale/2D, Pulsed wave Doppler 



                                         Race:C 



                                         ------------------------------------ 



                                         SUMMARY: 



                                         ------------------------------------ 



                                         DUPLEX SCAN OBSERVATIONS 



                                          Deep VeinsSuperficial Veins 



                                         RightLeft RightLeft 



                                          GSV (prox) Normal 



                                         CFV Normal Normal (above knee) 



                                         Femoral NormalGSV (dist) Normal 



                                         Profunda Normal(below knee) 



                                         Popliteal Normal 



                                         PT (prox) Not Visualized SSV Normal 



                                         PT (dist) Normal 



                                         Peroneal Normal 



                                         RIGHT:There is normal compressibility with no evidence of echogenic 



                                         material noted within the lumen of the visualized veins. Color flow 



                                         and Doppler signals are normal. 



                                         LEFT: Normal findings of the visualized veins. 



                                         PRELIMINARY FINDINGS 



                                         1. No evidence of venous thrombosis of the visualized veins. 



                                         2. Technically difficult study due to leg edema. 



                                         PHYSICIAN INTERPRETATION 



                                         Venous examination of the right lower extremity and leftgroin 



                                         demonstrated no evidence of venous thrombosis in the visualized veins. 



                                         Normal compressibility and augmentation of all veins visualized. 



                                         Signed 2018 05:22 PM 



                                         Melvin Burger MD, RPBRIEN 









                                        Procedure Note

 

                                        



Interface, Radiology Results In - 2018  5:23 PM CDT



                                   

                    Vascular Ultrasound Laboratory

                    Lower Extremity Venous Report

           [address] [facility] [address] [id number]

                                   

Pat.Name:  MECCA FLETCHER.ID:    [id number]               

.Date:   2018               Refer.MD:  PHYSICIAN, EMERGENCY, MD

Exam Time: 2:01:00 PM              Study Type:LE Venous               

  Age:  1931,87Y           Sex:       MALE                    

Sonogrphr: MAYELIN Vitale, RENUKA     Pat. Stat.:Outpatient              

Room:      [facility] EDP                 Tape  Vol: JM,                     

CPT - 4:   99023                   Echo Event ID:[id number]            

Order ID:  [id number]              

Reason for Study:Right leg edema , evaluate for DVT.

Procedures:Colorflow, Grayscale/2D, Pulsed wave Doppler

Race:      C                       

                                        ------------------------------------

SUMMARY:

                                        ------------------------------------

DUPLEX SCAN OBSERVATIONS

 

   Deep Veins  Superficial Veins

  Right  Left Right  Left

     GSV (prox) Normal  

 CFV Normal Normal (above knee)

 Femoral Normal  GSV (dist) Normal 

 Profunda Normal  (below knee)

 Popliteal Normal 

 PT (prox) Not Visualized   SSV Normal 

 PT (dist) Normal  

 Peroneal Normal  

 

 RIGHT:  There is normal compressibility with no evidence of echogenic

material noted within the lumen of the visualized veins. Color flow

and Doppler signals are normal.

 

 LEFT:     Normal findings of the visualized veins.

 

 PRELIMINARY FINDINGS

 1. No evidence of venous thrombosis of the visualized veins.

 2. Technically difficult study due to leg edema.

 

 PHYSICIAN INTERPRETATION 

 Venous examination of the right lower extremity and left  groin

demonstrated no evidence of venous thrombosis in the visualized veins.

 Normal compressibility and augmentation of all veins visualized.

 

Signed 2018 05:22 PM

Melvin Burger MD, TAMERA









   



                 Performing Organization     Address         City/State/Zipcode     Phone Number

 

   



                      [facility]            [address] [place] [address] 





* Partial thromboplastin time, activated (2018 12:50 PM CDT)





   



                 Component       Value           Ref Range       Performed At

 

   



                 PTT             33.1            23.0 - 36.0 sec     [facility] DEPARTMENT OF



                           Comment:                  PATHOLOGY AND



                           PTT therapeutic range for      GENOMIC MEDICINE



                                         unfractionated heparin is  



                                         61.0-112.0 seconds which  



                                         corresponds to Anti-Xa  



                                         0.3-0.7 U/ml.  













                                         Specimen

 





                                         Blood









   



                 Performing Organization     Address         City/[facility]/Zipcode     Phone Number

 

   



                     [address] [address] 



                                         PATHOLOGY AND GENOMIC   



                                         MEDICINE   





* Prothrombin time with INR (2018 12:50 PM CDT)





   



                 Component       Value           Ref Range       Performed At

 

   



                 Prothrombin time     14.8            12.0 - 15.0 sec     [facility] DEPARTMENT OF



                                         PATHOLOGY AND



                                         GENOMIC MEDICINE

 

   



                     INR                 1.1                 [facility] DEPARTMENT OF



                           Comment:                  PATHOLOGY AND



                           The International Normalized      GENOMIC MEDICINE



                                         Ratio (INR) is a therapeutic  



                                         monitoring tool for patients  



                                         who are stable on oral  



                                         anticoagulant therapy. An INR  



                                         of 2.0-3.0 is suggested for  



                                         deep  



                                         vein thrombosis/pulmonary  



                                         embolism.  













                                         Specimen

 





                                         Blood









   



                 Performing Organization     Address         [facility]     Phone Number

 

   



                     [address] [address] 



                                         PATHOLOGY AND GENOMIC   



                                         MEDICINE   





* Comprehensive metabolic panel (2018 12:50 PM CDT)



Only the most recent of 2 results within the time period is included.





   



                 Component       Value           Ref Range       Performed At

 

   



                 Sodium          141             135 - 148 mEq/L     [facility] DEPARTMENT OF



                                         PATHOLOGY AND



                                         GENOMIC MEDICINE

 

   



                 Potassium       3.8             3.5 - 5.0 mEq/L     [facility] DEPARTMENT OF



                                         PATHOLOGY AND



                                         GENOMIC MEDICINE

 

   



                 Chloride        99              98 - 112 mEq/L     [facility] DEPARTMENT OF



                                         PATHOLOGY AND



                                         GENOMIC MEDICINE

 

   



                 CO2             30              24 - 31 mEq/L     [facility] DEPARTMENT OF



                                         PATHOLOGY AND



                                         GENOMIC MEDICINE

 

   



                 Anion gap       12@ANIO         7 - 15 mEq/L     [facility] DEPARTMENT OF



                                         PATHOLOGY AND



                                         GENOMIC MEDICINE

 

   



                 BUN             20              8 - 23 mg/dL     [facility] DEPARTMENT OF



                                         PATHOLOGY AND



                                         GENOMIC MEDICINE

 

   



                 Creatinine      1.05            0.70 - 1.20 mg/dL     [facility] DEPARTMENT OF



                                         PATHOLOGY AND



                                         GENOMIC MEDICINE

 

   



                 Glucose         92              65 - 99 mg/dL     [facility] DEPARTMENT OF



                                         PATHOLOGY AND



                                         GENOMIC MEDICINE

 

   



                 Calcium         9.4             8.8 - 10.2 mg/dL     [facility] DEPARTMENT OF



                                         PATHOLOGY AND



                                         GENOMIC MEDICINE

 

   



                 Protein         7.2             6.3 - 8.3 g/dL     [facility] DEPARTMENT OF



                           Comment:                  PATHOLOGY AND



                           [place]      GENOMIC MEDICINE



                                          4.6-7.0 g/dL  



                                         1  



                                         week  



                                          4.4-7.6 g/dL  



                                         7  



                                         months-1year  



                                         5.1-7.3 g/dL  



                                         1-2  



                                         years5.6-7  



                                         .5 g/dL  



                                         >3  



                                         years6.0-8  



                                         .0 g/dL  



                                           



                                          6.3-8.3 g/dL  

 

   



                 Albumin         3.6             3.5 - 5.0 g/dL     [facility] DEPARTMENT OF



                                         PATHOLOGY AND



                                         GENOMIC MEDICINE

 

   



                 A/G ratio       1.0             0.7 - 3.8       [facility] DEPARTMENT OF



                                         PATHOLOGY AND



                                         GENOMIC MEDICINE

 

   



                 Alkaline phosphatase     99              40 - 129 U/L     [facility] DEPARTMENT OF



                                         PATHOLOGY AND



                                         GENOMIC MEDICINE

 

   



                 AST             17              10 - 50 U/L     [facility] DEPARTMENT OF



                                         PATHOLOGY AND



                                         GENOMIC MEDICINE

 

   



                 ALT             15              5 - 50 U/L      [facility] DEPARTMENT OF



                                         PATHOLOGY AND



                                         GENOMIC MEDICINE

 

   



                 Total bilirubin     0.8             0.0 - 1.2 mg/dL     [facility] DEPARTMENT OF



                                         PATHOLOGY AND



                                         GENOMIC MEDICINE













                                         Specimen

 





                                         Plasma specimen









   



                 Performing Organization     Address         City/State/Zipcode     Phone Number

 

   



                     [facility] DEPARTMENT OF     [address] [place] [address] 



                                         PATHOLOGY AND GENOMIC   



                                         MEDICINE   





* Echocardiogram complete w contrast and 3D if needed (2018  1:45 PM CDT)





 



                           Narrative                 Performed At

 

 



                                [facility]





                                         Echocardiography Report 



                                          [address] [place] [address] 9, [place] [address] 



                                          



                                         Pat.Name:MECCA FLETCHER 



                                         Pat.ID:[id number] 



                                         .Date: 9/3/2018Exam Time: 1:22:00 



                                         PM 



                                         Study Type:Routine 



                                         EchoHeight:74in

 



                                          



                                         Weight:212lb BSA: 2.23 





                                         m2 



                                         DOBAge:1931,87Y Sex: 



                                         MALE 



                                         BP:185/84HR:

 



                                         70 bpm 



                                         Sonogrphr: CARMEN Bass, [facility] Pat. 



                                         Stat.:Inpatient 



                                         Room:[id number] 



                                         Status:Final 



                                         Echo Event ID:[id number] 



                                         Order ID:[id number] 



                                         Reason for Study:Chest Pain 



                                         Procedures:2D Echo, Colorflow Doppler, Strain 



                                         ------------------------------------ 



                                         FINDINGS: 



                                         ------------------------------------ 



                                         LV: LV size is normal. LV EF is normal. Overall wall motion is 



                                         normal.Estimated EF is 60-64%. 



                                         RV: RV size is normal. RV systolic function is normal. 



                                         LA: LA size is normal. 



                                         RA: RA size is normal. 



                                         AO: Aortic root diameter is normal. 



                                         MAYRA: No pericardial effusion. There is an anterior space 



                                         consistentwith a prominent epicardial fat pad. 



                                         AV: Focal calcification of AV leaflets. A trace of aortic 



                                         regurgitation. 



                                         MV: No structural MV abnormalities noted. A trace of mitral 



                                         regurgitation. 



                                         PV: Pulmonic valve not well seen. 



                                         TV: No structural TV abnormalities noted. Mild tricuspid 



                                         regurgitation 



                                         Clemons: LV relaxation is reduced, appropriate for age. LV filling 



                                         pressureis normal. 



                                         Other:Insufficient TR jet to estimate PA systolic pressure. 



                                         ------------------------------------ 



                                         MEASUREMENTS: 



                                         ------------------------------------ 



                                         2D 



                                         Parasternal Long Axis 



                                         LVOT 1.9 cmLA 



                                         Ds2.9 cm 



                                         LVIDd4.3 



                                         cmIndex1.9 



                                         cm/m 



                                         Ao An1.9 cm 



                                         LVIDs2.1 cmAo 



                                         Rtd 3.6 cm 



                                         Index1.6 cm/m 



                                         LV%fs 52.4 % 



                                         LV Kfzo574.4 g(122-174) 



                                         IVSd 1 cmLVM 



                                         Index 56.2 g/m2 



                                         LVPWd0.8 



                                         cmRWT0.4 



                                         LA Sng Plane 



                                         LA Area 23 cm2(8.8-23.4) LA 



                                         Vol70 ml 



                                         Index31.4 ml/m 



                                         LA LngAx 6 cm 



                                         DOPPLER 



                                         LVOT Stroke Vol 



                                         LVOT 1.9 cmLVOT 



                                         CO3.4 l/min 



                                         LVOT TVI18.3 cmLVOT 



                                         CI1.5 l/m/m2 



                                         LVOT Tm328 



                                         hnngNL11 bpm 



                                         LVOT SV 51.8 ml 



                                         Signed 2018 07:50 PM 



                                         Justine Michael MD 









                                        Procedure Note

 

                                        



Interface, Radiology Results In - 2018  7:50 PM CDT



                                   

                        Echocardiography Report

           [address] [address]

                                   

[facility].Name:  MECCA FLETCHER.ID:    [id number]               

.Date:   9/3/2018                Exam Time: 1:22:00 PM              

Study Type:Routine Echo            Height:    74in                    

Weight:    212lb                   BSA:       2.23 m2                 

  Age:  1931,87Y           Sex:       MALE                    

BP:        185/84                  HR:        70 bpm                  

Sonogrphr: CARMEN Bass, RCS     Pat. Stat.:Inpatient               

Room:      [id number]                    Study Status:Final                 

Echo Event ID:[id number]            

Order ID:  [id number]              

Reason for Study:Chest Pain        

Procedures:2D Echo, Colorflow Doppler, Strain

                                        ------------------------------------

FINDINGS:

                                        ------------------------------------

LV:       LV size is normal. LV EF is normal. Overall wall motion is

          normal.  Estimated EF is 60-64%.

RV:       RV size is normal. RV systolic function is normal.

LA:       LA size is normal.

RA:       RA size is normal.

AO:       Aortic root diameter is normal.

MAYRA:     No pericardial effusion. There is an anterior space

          consistent  with a prominent epicardial fat pad.

AV:       Focal calcification of AV leaflets. A trace of aortic

          regurgitation. 

MV:       No structural MV abnormalities noted. A trace of mitral

          regurgitation. 

PV:       Pulmonic valve not well seen.

TV:       No structural TV abnormalities noted. Mild tricuspid

          regurgitation 

Clemons:     LV relaxation is reduced, appropriate for age. LV filling

          pressure  is normal.

Other:    Insufficient TR jet to estimate PA systolic pressure.

                                        ------------------------------------

MEASUREMENTS:

                                        ------------------------------------

                                  2D

Parasternal Long Axis

 LVOT             1.9 cm            LA Ds            2.9 cm  

 LVIDd            4.3 cm            Index        1.9 cm/m            

 Ao An            1.9 cm  

 LVIDs            2.1 cm            Ao Rtd           3.6 cm  

 Index        1.6 cm/m

 LV%fs           52.4 %            

 LV Mass        125.4 g    (122-174)

 IVSd               1 cm            LVM Index       56.2 g/m2

 LVPWd            0.8 cm            RWT              0.4     

LA Sng Plane

 LA Area           23 cm2  (8.8-23.4) LA Vol            70 ml  

 Index        31.4 ml/m

 LA LngAx           6 cm           

                                DOPPLER

LVOT Stroke Vol 

 LVOT             1.9 cm            LVOT CO          3.4 l/min

 LVOT TVI        18.3 cm            LVOT CI          1.5 l/m/m2

 LVOT Tm          328 msec          HR                66 bpm 

 LVOT SV         51.8 ml            

 

Signed 2018 07:50 PM

Justine Michael MD









   



                 Performing Organization     Address         City/[facility]/Zipcode     Phone Number

 

   



                      [facility]            [address] [place] [address] 





* Troponin (2018 10:36 AM CDT)



Only the most recent of 2 results within the time period is included.





   



                 Component       Value           Ref Range       Performed At

 

   



                 Troponin        <0.30           0.00 - 0.30 ng/mL     [facility] DEPARTMENT OF



                           Comment:                  PATHOLOGY AND



                           0.30 - 1.49               GENOMIC MEDICINE



                                         ng/mlMay  



                                         indicate increased risk of  



                                         acute  



                                           



                                          coronary  



                                         syndrome.  



                                           



                                           



                                         >=1.5  



                                         ng/ml  



                                         Consistent with acute  



                                         myocardial  



                                           



                                          infarction.  



                                           



                                           



                                           



                                           



                                           



                                         The diagnostic value of a  



                                         single normal or  



                                         non-diagnostic  



                                         result is  



                                         questionable.Serial  



                                         samples at 2-6 hour intervals  



                                         are required to rule out acute  



                                         myocardial injury.  













                                         Specimen

 





                                         Plasma specimen









   



                 Performing Organization     Address         City/[facility]     Phone Number

 

   



                     [facility] DEPARTMENT OF     [address] [place] [address] 



                                         PATHOLOGY AND GENOMIC   



                                         MEDICINE   





* XR Chest 1 Vw Portable (2018  7:54 AM CDT)





 



                           Narrative                 Performed At

 

 



                           EXAMINATION:XR CHEST 1 VW PORTABLE      [facility]



                                         CLINICAL HISTORY:sob 



                                         COMPARISON:2016 



                                         IMPRESSION: 



                                         1.A left subclavian dual-lead pacemaker is present. 



                                         2.Heart size is normal. 



                                         3.There is no evidence of pulmonary edema. There are no focal consolidations

 



                                         or effusions. 



                                         4.Degenerative changes are present in the shoulders. 



                                         [id number] 









                                        Procedure Note

 

                                        



 Interface, Radiology Results Incoming - 2018  8:18 AM CDT



EXAMINATION:  XR CHEST 1 VW PORTABLE



CLINICAL HISTORY:  sob



COMPARISON:  2016



IMPRESSION:



                                        1.  A left subclavian dual-lead pacemaker is present.

                                        2.  Heart size is normal.

                                        3.  There is no evidence of pulmonary edema. There are no focal consolidations or

 effusions.

                                        4.  Degenerative changes are present in the shoulders.



 



[facility]-[id number]











   



                 Performing Organization     Address         City/[facility]     Phone Number

 

   



                     [facility]          [address] [place] [address] 





* Estimated GFR (2018  7:18 AM CDT)





   



                 Component       Value           Ref Range       Performed At

 

   



                 GFR Non Af Amer     71              mL/min/1.73 m2     [facility] DEPARTMENT OF



                                         PATHOLOGY AND



                                         GENOMIC MEDICINE

 

   



                 GFR Af Amer     86              mL/min/1.73 m2     [facility] DEPARTMENT OF



                           Comment:                  PATHOLOGY AND



                           Chronic kidney disease: <60      GENOMIC MEDICINE



                                         mL/min/1.73m2  



                                         Kidney failure: <15  



                                         mL/min/1.73m2  



                                         The estimated GFR is  



                                         calculated from the  



                                         IDMS-traceable Modification of  



                                         Diet  



                                         in Renal Disease Equation. The  



                                         accuracy of the calculation is  



                                         poor when the  



                                         creatinine is normal.  



                                         Calculated values >90  



                                         mL/min/1.73m2 are not  



                                         reported.  



                                         This equation has not been  



                                         validated in children (<18  



                                         years), pregnant  



                                         women, the elderly (>70  



                                         years), or ethnic groups other  



                                         than Caucasians and  



                                          Americans.  













                                         Specimen

 





                                         Plasma specimen









   



                 Performing Organization     Address         City/[facility]/Zipcode     Phone Number

 

   



                     [place] [address] 



                                         PATHOLOGY AND GENOMIC   



                                         MEDICINE   





* B natriuretic peptide (2018  7:18 AM CDT)





   



                 Component       Value           Ref Range       Performed At

 

   



                 BNP             41              0 - 100 pg/mL     [facility] DEPARTMENT OF



                                         PATHOLOGY AND



                                         GENOMIC MEDICINE













                                         Specimen

 





                                         Blood









   



                 Performing Organization     Address         City/[facility]/Zipcode     Phone Number

 

   



                     [facility] DEPARTMENT OF     [address] [place] [address] 



                                         PATHOLOGY AND UGE   



                                         MEDICINE   





* ECG ED Preliminary Interpretation - NOT AN ORDER (2018  7:12 AM CDT)





 



                           Narrative                 Performed At

 

 



                                         Law Gaytan MD 2018 10:45 PM 



                                         ECG ED Preliminary Interpretation - Not an Order 



                                         Performed by: LAW GAYTAN 



                                         Authorized by: LAW GAYTAN 



                                         ECG reviewed by ED Physician in the absence of a cardiologist: yes 



                                         Interpretation: 



                                         Interpretation: non-specific 



                                         Rate: 



                                         ECG rate:71 



                                         ECG rate assessment: normal 



                                         Rhythm: 



                                         Rhythm: A-V block 



                                         Ectopy: 



                                         Ectopy: none 



                                         QRS: 



                                         QRS axis:Normal 



                                         QRS intervals:Normal 



                                         Conduction: 



                                         Conduction: normal 



                                         ST segments: 



                                         ST segments:Normal 



                                         T waves: 



                                         T waves: normal 





* ECG 12 lead (2018  7:01 AM CDT)





   



                 Component       Value           Ref Range       Performed At

 

   



                     Ventricular rate     71                  [facility]

 

   



                     Atrial rate         71                  [facility]

 

   



                     SD interval         262                 [facility]

 

   



                     QRSD interval       90                  [facility]

 

   



                     QT interval         366                 [facility]

 

   



                     QTC interval        397                 [facility]

 

   



                     P axis 1            89                  [facility]

 

   



                     QRS axis 1          69                  [facility]

 

   



                     T wave axis         70                  [facility]

 

   



                     EKG impression      Sinus rhythm with 1st degree      [facility]



                                         AV block-Otherwise normal  



                                         ECG-In automated comparison  



                                         with ECG of 09-AUG-2016  



                                         05:03,-Sinus rhythm has  



                                         replaced Electronic atrial  



                                         pacemaker-Electronically  



                                         Signed By Aric Mercado MD  



                                         ([id number]) on 9/3/2018 10:44:43 PM  









   



                 Performing Organization     Address         City/State/Zipcode     Phone Number

 

   



                     [facility]            [address] [place] [address] 





after 2018



Insurance







     



            Payer      Benefit     Subscriber ID     Type       Phone      Address



                                         Plan /    



                                         Group    

 

     



              MEDICARE     MEDICARE     xxxxxxxxxx     Medicare      [place], [place]



                                         PART A AND    



                                         B    

 

     



                 UHC             UNITED          xxxxxxxxx       [facility]    



                                         INDEMTY    









     



            Guarantor Name     Account     Relation to     Date of     Phone      Billing Address



                     Type                Patient             Birth  

 

     



            Jose MMecca     Personal/F     Self       1931     [phone]     [address] [place] [address]               ([place])              [place] [address]







Advance Directives





Patient has advance care planning documents on file. For more information, lila katz contact:



Konstantin Cason



[address] [place] [address]

## 2019-04-29 ENCOUNTER — APPOINTMENT (RX ONLY)
Dept: URBAN - METROPOLITAN AREA CLINIC 153 | Facility: CLINIC | Age: 84
Setting detail: DERMATOLOGY
End: 2019-04-29

## 2019-04-29 DIAGNOSIS — L57.0 ACTINIC KERATOSIS: ICD-10-CM

## 2019-04-29 DIAGNOSIS — D485 NEOPLASM OF UNCERTAIN BEHAVIOR OF SKIN: ICD-10-CM

## 2019-04-29 DIAGNOSIS — Z85.828 PERSONAL HISTORY OF OTHER MALIGNANT NEOPLASM OF SKIN: ICD-10-CM

## 2019-04-29 DIAGNOSIS — L82.1 OTHER SEBORRHEIC KERATOSIS: ICD-10-CM

## 2019-04-29 PROBLEM — D48.5 NEOPLASM OF UNCERTAIN BEHAVIOR OF SKIN: Status: ACTIVE | Noted: 2019-04-29

## 2019-04-29 PROBLEM — H91.90 UNSPECIFIED HEARING LOSS, UNSPECIFIED EAR: Status: ACTIVE | Noted: 2019-04-29

## 2019-04-29 PROBLEM — Z85.46 PERSONAL HISTORY OF MALIGNANT NEOPLASM OF PROSTATE: Status: ACTIVE | Noted: 2019-04-29

## 2019-04-29 PROBLEM — L29.8 OTHER PRURITUS: Status: ACTIVE | Noted: 2019-04-29

## 2019-04-29 PROCEDURE — 17004 DESTROY PREMAL LESIONS 15/>: CPT

## 2019-04-29 PROCEDURE — 69100 BIOPSY OF EXTERNAL EAR: CPT

## 2019-04-29 PROCEDURE — ? BIOPSY BY SHAVE METHOD

## 2019-04-29 PROCEDURE — ? LIQUID NITROGEN

## 2019-04-29 ASSESSMENT — LOCATION SIMPLE DESCRIPTION DERM
LOCATION SIMPLE: RIGHT CHEEK
LOCATION SIMPLE: RIGHT HAND
LOCATION SIMPLE: RIGHT UPPER BACK
LOCATION SIMPLE: CHEST
LOCATION SIMPLE: RIGHT FOREARM
LOCATION SIMPLE: LEFT HAND
LOCATION SIMPLE: RIGHT EAR
LOCATION SIMPLE: LEFT CHEEK
LOCATION SIMPLE: LEFT TEMPLE
LOCATION SIMPLE: LEFT EAR
LOCATION SIMPLE: RIGHT WRIST
LOCATION SIMPLE: NECK
LOCATION SIMPLE: LEFT FOREARM

## 2019-04-29 ASSESSMENT — LOCATION ZONE DERM
LOCATION ZONE: HAND
LOCATION ZONE: TRUNK
LOCATION ZONE: ARM
LOCATION ZONE: EAR
LOCATION ZONE: NECK
LOCATION ZONE: FACE

## 2019-04-29 ASSESSMENT — LOCATION DETAILED DESCRIPTION DERM
LOCATION DETAILED: LEFT RADIAL DORSAL HAND
LOCATION DETAILED: LEFT PROXIMAL DORSAL FOREARM
LOCATION DETAILED: LEFT MEDIAL SUPERIOR CHEST
LOCATION DETAILED: RIGHT CENTRAL LATERAL NECK
LOCATION DETAILED: RIGHT CENTRAL MALAR CHEEK
LOCATION DETAILED: LEFT INFERIOR POSTERIOR HELIX
LOCATION DETAILED: LEFT MID TEMPLE
LOCATION DETAILED: RIGHT INFERIOR POSTERIOR HELIX
LOCATION DETAILED: LEFT ULNAR DORSAL HAND
LOCATION DETAILED: LEFT DISTAL DORSAL FOREARM
LOCATION DETAILED: LEFT INFERIOR HELIX
LOCATION DETAILED: RIGHT DISTAL DORSAL FOREARM
LOCATION DETAILED: LEFT SUPERIOR LATERAL MALAR CHEEK
LOCATION DETAILED: RIGHT DORSAL WRIST
LOCATION DETAILED: RIGHT RADIAL DORSAL HAND
LOCATION DETAILED: RIGHT INFERIOR UPPER BACK
LOCATION DETAILED: RIGHT INFERIOR MEDIAL MALAR CHEEK
LOCATION DETAILED: RIGHT SUPERIOR PREAURICULAR CHEEK

## 2019-04-29 NOTE — PROCEDURE: BIOPSY BY SHAVE METHOD
Electrodesiccation And Curettage Text: The wound bed was treated with electrodesiccation and curettage after the biopsy was performed.
Detail Level: Detailed
Billing Type: Third-Party Bill
Consent: Written consent was obtained and risks were reviewed including but not limited to scarring, infection, bleeding, scabbing, incomplete removal, nerve damage and allergy to anesthesia.
Destruction After The Procedure: No
Biopsy Type: H and E
Depth Of Biopsy: dermis
Wound Care: Polysporin ointment
Size Of Lesion In Cm: 0
Hemostasis: Drysol
Path Notes (To The Dermatopathologist): Bleeding papule ; Rule out BCC
Type Of Destruction Used: Curettage
Electrodesiccation Text: The wound bed was treated with electrodesiccation after the biopsy was performed.
Curettage Text: After shave biopsy the base of the lesion was removed with curettage.
Cryotherapy Text: The wound bed was treated with cryotherapy after the biopsy was performed.
Biopsy Method: Dermablade
Dressing: telfa dressing
Anesthesia Volume In Cc (Will Not Render If 0): 1.7
Silver Nitrate Text: The wound bed was treated with silver nitrate after the biopsy was performed.
Anesthesia Type: 1% lidocaine with epinephrine and a 1:10 solution of 8.4% sodium bicarbonate
Post-Care Instructions: I reviewed with the patient in detail post-care instructions.
Notification Instructions: Patient will be notified of biopsy results. However, patient instructed to call the office if not contacted within 2 weeks.
Was A Bandage Applied: Yes

## 2019-05-23 ENCOUNTER — APPOINTMENT (RX ONLY)
Dept: URBAN - METROPOLITAN AREA CLINIC 153 | Facility: CLINIC | Age: 84
Setting detail: DERMATOLOGY
End: 2019-05-23

## 2019-05-23 VITALS — HEIGHT: 73 IN | SYSTOLIC BLOOD PRESSURE: 163 MMHG | WEIGHT: 212 LBS | DIASTOLIC BLOOD PRESSURE: 89 MMHG

## 2019-05-23 PROBLEM — C4A.4 MERKEL CELL CARCINOMA OF SCALP AND NECK: Status: ACTIVE | Noted: 2019-05-23

## 2019-05-23 PROCEDURE — 14061 TIS TRNFR E/N/E/L10.1-30SQCM: CPT

## 2019-05-23 PROCEDURE — ? MOHS SURGERY

## 2019-05-23 PROCEDURE — A4550 SURGICAL TRAYS: HCPCS

## 2019-05-23 PROCEDURE — 17311 MOHS 1 STAGE H/N/HF/G: CPT

## 2019-05-23 NOTE — PROCEDURE: MOHS SURGERY
Quadrant Reporting?: no
Stage 15: Additional Anesthesia Volume In Cc: 0
Asc Procedure Text (E): After obtaining clear surgical margins the patient was sent to an ASC for surgical repair.  The patient understands they will receive post-surgical care and follow-up from the ASC physician.
Consent (Lip)/Introductory Paragraph: The rationale for Mohs was explained to the patient and consent was obtained. The risks, benefits and alternatives to therapy were discussed in detail. Specifically, the risks of lip deformity, changes in the oral aperture, infection, scarring, bleeding, prolonged wound healing, incomplete removal, allergy to anesthesia, nerve injury and recurrence were addressed. Prior to the procedure, the treatment site was clearly identified and confirmed by the patient. All components of Universal Protocol/PAUSE Rule completed.
Asc Procedure Text (F): After obtaining clear surgical margins the patient was sent to an ASC for surgical repair.  The patient understands they will receive post-surgical care and follow-up from the ASC physician.
Use Subsequent Stages Verbiage?: Yes
Body Location Override (Optional - Billing Will Still Be Based On Selected Body Map Location If Applicable): right inferior posterior helix
Anesthesia Type: 1% lidocaine with epinephrine and a 1:10 solution of 8.4% sodium bicarbonate
Suturegard Retention Suture: 2-0 Nylon
M-Plasty Intermediate Repair Preamble Text (Leave Blank If You Do Not Want): Undermining was performed with blunt dissection.
Eye Protection Verbiage: Before proceeding with the stage, a plastic scleral shield was inserted. The globe was anesthetized with a few drops of 1% lidocaine with 1:100,000 epinephrine. Then, an appropriate sized scleral shield was chosen and coated with lacrilube ointment. The shield was gently inserted and left in place for the duration of each stage. After the stage was completed, the shield was gently removed.
Subsequent Stages Histo Method Verbiage: The area was prepped in the usual fashion. Using a similar technique to that described above, a thin\\nlayer of tissue was removed from all areas where tumor was visible on the previous stage.  The tissue was again\\noriented, mapped, dyed, and processed as above. After hemostasis, the specimen was oriented, mapped and\\ndivided
Wound Check: 14 days
Provider Procedure Text (F): After obtaining clear surgical margins the defect was repaired by another provider.
Mid-Level Procedure Text (E): After obtaining clear surgical margins the patient was sent to a mid-level provider for surgical repair.  The patient understands they will receive post-surgical care and follow-up from the mid-level provider.
Graft Donor Site Epidermal Sutures (Optional): 4-0 Monocryl
Mid-Level Procedure Text (A): After obtaining clear surgical margins the patient was sent to a mid-level provider for surgical repair.  The patient understands they will receive post-surgical care and follow-up from the mid-level provider.
Tumor Debulked?: curette
Anesthesia Type: 1% lidocaine with 1:100,000 epinephrine and a 1:10 solution of 8.4% sodium bicarbonate
Stage 15: Additional Anesthesia Type: 1% lidocaine with epinephrine
Surgeon Performing Repair (Optional): Nehemiah Hoffman M.D.
Otolaryngologist Procedure Text (B): After obtaining clear surgical margins the patient was sent to otolaryngology for surgical repair.  The patient understands they will receive post-surgical care and follow-up from the referring physician's office.
Otolaryngologist Procedure Text (A): After obtaining clear surgical margins the patient was sent to otolaryngology for surgical repair.  The patient understands they will receive post-surgical care and follow-up from the referring physician's office.
Surgeon: Nehemiah Hoffman M.D.
Double O-Z Plasty Text: The defect edges were debeveled with a #15 scalpel blade.  Given the location of the defect, shape of the defect and the proximity to free margins a Double O-Z plasty (double transposition flap) was deemed most appropriate.  Using a sterile surgical marker, the appropriate transposition flaps were drawn incorporating the defect and placing the expected incisions within the relaxed skin tension lines where possible. The area thus outlined was incised deep to adipose tissue with a #15 scalpel blade.  The skin margins were undermined to an appropriate distance in all directions utilizing iris scissors.  Hemostasis was achieved with electrocautery.  The flaps were then transposed into place, one clockwise and the other counterclockwise, and anchored with interrupted buried subcutaneous sutures.
Non-Graft Cartilage Fenestration Text: The cartilage was fenestrated with a 2mm punch biopsy to help facilitate healing.
Oculoplastic Surgeon Procedure Text (E): After obtaining clear surgical margins the patient was sent to oculoplastics for surgical repair.  The patient understands they will receive post-surgical care and follow-up from the referring physician's office.
Dressing (No Sutures): dry sterile dressing
Mercedes Flap Text: The defect edges were debeveled with a #15 scalpel blade.  Given the location of the defect, shape of the defect and the proximity to free margins a Mercedes flap was deemed most appropriate.  Using a sterile surgical marker, an appropriate advancement flap was drawn incorporating the defect and placing the expected incisions within the relaxed skin tension lines where possible. The area thus outlined was incised deep to adipose tissue with a #15 scalpel blade.  The skin margins were undermined to an appropriate distance in all directions utilizing iris scissors.
Area L Indication Text: Tumors in this location are included in\\nArea L (trunk and extremities).
Postop Diagnosis: same
Area M Indication Text: Tumors in this location are included in\\nArea M (cheek, forehead, scalp, neck, jawline and pretibial skin).
Epidermal Sutures: 5-0 Monocryl
Retention Suture Bite Size: 3 mm
Oculoplastic Surgeon Procedure Text (B): After obtaining clear surgical margins the patient was sent to oculoplastics for surgical repair.  The patient understands they will receive post-surgical care and follow-up from the referring physician's office.
Epidermal Closure: running and interrupted
Graft Cartilage Fenestration Text: The cartilage was fenestrated with a 2mm punch biopsy to help facilitate graft survival and healing.
Secondary Intention Text (Leave Blank If You Do Not Want): The defect will heal with secondary intention.
Primary Defect Width In Cm (Final Defect Size - Required For Flaps/Grafts): 3.1
Bilobed Transposition Flap Text: The defect edges were debeveled with a #15 scalpel blade.  Given the location of the defect and the proximity to free margins a bilobed transposition flap was deemed most appropriate.  Using a sterile surgical marker, an appropriate bilobe flap drawn around the defect.    The area thus outlined was incised deep to adipose tissue with a #15 scalpel blade.  The skin margins were undermined to an appropriate distance in all directions utilizing iris scissors.
Suturegard Body: The suture ends were repeatedly re-tightened and re-clamped to achieve the desired tissue expansion.
Information: Selecting Yes will display possible errors in your note based on the variables you have selected. This validation is only offered as a suggestion for you. PLEASE NOTE THAT THE VALIDATION TEXT WILL BE REMOVED WHEN YOU FINALIZE YOUR NOTE. IF YOU WANT TO FAX A PRELIMINARY NOTE YOU WILL NEED TO TOGGLE THIS TO 'NO' IF YOU DO NOT WANT IT IN YOUR FAXED NOTE.
Muscle Hinge Flap Text: The defect edges were debeveled with a #15 scalpel blade.  Given the size, depth and location of the defect and the proximity to free margins a muscle hinge flap was deemed most appropriate.  Using a sterile surgical marker, an appropriate hinge flap was drawn incorporating the defect. The area thus outlined was incised with a #15 scalpel blade.  The skin margins were undermined to an appropriate distance in all directions utilizing iris scissors.
Partial Purse String (Intermediate) Text: Given the location of the defect and the characteristics of the surrounding skin an intermediate purse string closure was deemed most appropriate.  Undermining was performed circumfirentially around the surgical defect.  A purse string suture was then placed and tightened. Wound tension only allowed a partial closure of the circular defect.
Banner Transposition Flap Text: The defect edges were debeveled with a #15 scalpel blade.  Given the location of the defect and the proximity to free margins a Banner transposition flap was deemed most appropriate.  Using a sterile surgical marker, an appropriate flap drawn around the defect. The area thus outlined was incised deep to adipose tissue with a #15 scalpel blade.  The skin margins were undermined to an appropriate distance in all directions utilizing iris scissors.
Repair Hemostasis (Optional): Electrocoagulation
O-Z Flap Text: The defect edges were debeveled with a #15 scalpel blade.  Given the location of the defect, shape of the defect and the proximity to free margins an O-Z flap was deemed most appropriate.  Using a sterile surgical marker, an appropriate transposition flap was drawn incorporating the defect and placing the expected incisions within the relaxed skin tension lines where possible. The area thus outlined was incised deep to adipose tissue with a #15 scalpel blade.  The skin margins were undermined to an appropriate distance in all directions utilizing iris scissors.
Wound Care (No Sutures): Petrolatum
Plastic Surgeon Procedure Text (E): After obtaining clear surgical margins the patient was sent to plastics for surgical repair.  The patient understands they will receive post-surgical care and follow-up from the referring physician's office.
Initial Size Of Lesion: 0.7
Star Wedge Flap Text: The defect edges were debeveled with a #15 scalpel blade.  Given the location of the defect, shape of the defect and the proximity to free margins a star wedge flap was deemed most appropriate.  Using a sterile surgical marker, an appropriate rotation flap was drawn incorporating the defect and placing the expected incisions within the relaxed skin tension lines where possible. The area thus outlined was incised deep to adipose tissue with a #15 scalpel blade.  The skin margins were undermined to an appropriate distance in all directions utilizing iris scissors.
Medical Necessity Statement: Based on my medical judgement, Mohs surgery is the most appropriate treatment for this cancer compared to\\nother treatments. I discussed alternative treatments to Mohs surgery and specifically discussed the risks and\\nbenefits of curettage, excision with permanent sections, radiation therapy, and foregoing treatment. The rationale\\nfor Mohs surgery was explained to the patient and consent was obtained. The risks, benefits and alternatives to\\ntherapy were discussed in detail. Specifically, the risks of infection, scarring, bleeding, prolonged wound healing,\\nincomplete removal, allergy to anesthesia, nerve injury and recurrence were addressed. Prior to the procedure,\\nthe treatment site was clearly identified and confirmed by the patient. All components of Universal\\nProtocol/PAUSE Rule completed. All laboratory services were performed in the [facility], [address] [place] [address], [address], [place] [address].  Special stains are not necessarily approved by the [place]. Food and Drug Administration and are used to improve diagnostic accuracy.
Tarsorrhaphy Text: A tarsorrhaphy was performed using Frost sutures.
Additional Anesthesia Volume In Cc: 6
Surgeon/Pathologist Verbiage (Will Incorporate Name Of Surgeon From Intro If Not Blank): operated in two distinct and integrated capacities as the surgeon and pathologist.
Plastic Surgeon Procedure Text (B): After obtaining clear surgical margins the patient was sent to plastics for surgical repair.  The patient understands they will receive post-surgical care and follow-up from the referring physician's office.
M-Plasty Complex Repair Preamble Text (Leave Blank If You Do Not Want): Extensive wide undermining was performed.
Closure 4 Information: This tab is for additional flaps and grafts above and beyond our usual structured repairs.  Please note if you enter information here it will not currently bill and you will need to add the billing information manually.
Rhomboid Transposition Flap Text: The defect edges were debeveled with a #15 scalpel blade.  Given the location of the defect and the proximity to free margins a rhomboid transposition flap was deemed most appropriate.  Using a sterile surgical marker, an appropriate rhomboid flap was drawn incorporating the defect.    The area thus outlined was incised deep to adipose tissue with a #15 scalpel blade.  The skin margins were undermined to an appropriate distance in all directions utilizing iris scissors.
Manual Repair Warning Statement: We plan on removing the manually selected variable below in favor of our much easier automatic structured text blocks found in the previous tab. We decided to do this to help make the flow better and give you the full power of structured data. Manual selection is never going to be ideal in our platform and I would encourage you to avoid using manual selection from this point on, especially since I will be sunsetting this feature. It is important that you do one of two things with the customized text below. First, you can save all of the text in a word file so you can have it for future reference. Second, transfer the text to the appropriate area in the Library tab. Lastly, if there is a flap or graft type which we do not have you need to let us know right away so I can add it in before the variable is hidden. No need to panic, we plan to give you roughly 6 months to make the change.
Detail Level: Detailed
Mohs Method Verbiage: An incision at a 45 degree angle following the standard Mohs\\napproach was done and the specimen was harvested as a microscopically controlled layer.
Unna Boot Text: An Unna boot was placed to help immobilize the limb and facilitate more rapid healing.
Mohs Histo Method Verbiage: Each section was then chromacoded and processed in the Mohs lab using the Mohs protocol and submitted for frozen section.
Donor Site Anesthesia Type: same as repair anesthesia
Repair Anesthesia Method: local infiltration
Anesthesia Volume In Cc: 4.6
X Size Of Lesion In Cm (Optional): 0.6
Skin Substitute Text: The defect edges were debeveled with a #15 scalpel blade.  Given the location of the defect, shape of the defect and the proximity to free margins a skin substitute graft was deemed most appropriate.  The graft material was trimmed to fit the size of the defect. The graft was then placed in the primary defect and oriented appropriately.
Mohs Case Number: [id number]
Wound Care: Polysporin ointment
Double O-Z Flap Text: The defect edges were debeveled with a #15 scalpel blade.  Given the location of the defect, shape of the defect and the proximity to free margins a Double O-Z flap was deemed most appropriate.  Using a sterile surgical marker, an appropriate transposition flap was drawn incorporating the defect and placing the expected incisions within the relaxed skin tension lines where possible. The area thus outlined was incised deep to adipose tissue with a #15 scalpel blade.  The skin margins were undermined to an appropriate distance in all directions utilizing iris scissors.
Partial Purse String (Simple) Text: Given the location of the defect and the characteristics of the surrounding skin a simple purse string closure was deemed most appropriate.  Undermining was performed circumfirentially around the surgical defect.  A purse string suture was then placed and tightened. Wound tension only allowed a partial closure of the circular defect.
Closure 2 Information: This tab is for additional flaps and grafts, including complex repair and grafts and complex repair and flaps. You can also specify a different location for the additional defect, if the location is the same you do not need to select a new one. We will insert the automated text for the repair you select below just as we do for solitary flaps and grafts. Please note that at this time if you select a location with a different insurance zone you will need to override the ICD10 and CPT if appropriate.
Mauc Instructions: By selecting yes to the question below the MAUC number will be added into the note.  This will be calculated automatically based on the diagnosis chosen, the size entered, the body zone selected (H,M,L) and the specific indications you chose. You will also have the option to override the Mohs AUC if you disagree with the automatically calculated number and this option is found in the Case Summary tab.
Consent Type: Consent 1 (Standard)
Post-Care Instructions: I reviewed in detail the post-care instructions.
Number Of Stages: 1
Keystone Flap Text: The defect edges were debeveled with a #15 scalpel blade.  Given the location of the defect, shape of the defect a keystone flap was deemed most appropriate.  Using a sterile surgical marker, an appropriate keystone flap was drawn incorporating the defect, outlining the appropriate donor tissue and placing the expected incisions within the relaxed skin tension lines where possible. The area thus outlined was incised deep to adipose tissue with a #15 scalpel blade.  The skin margins were undermined to an appropriate distance in all directions around the primary defect and laterally outward around the flap utilizing iris scissors.
Estimated Blood Loss (Cc): minimal
Stage 1: Number Of Blocks?: 2
Location Indication Override (Is Already Calculated Based On Selected Body Location): Area H
Anesthesia Volume In Cc: 3
Suturegard Intro: Intraoperative tissue expansion was performed, utilizing the SUTUREGARD device, in order to reduce wound tension.
Complex Repair And Flap Additional Text (Will Appearing After The Standard Complex Repair Text): The complex repair was not sufficient to completely close the primary defect. The remaining additional defect was repaired with the flap mentioned below.
Dressing: pressure dressing with telfa
Secondary Defect Width In Cm (Required For Flaps): 1.5
Complex Repair And Graft Additional Text (Will Appearing After The Standard Complex Repair Text): The complex repair was not sufficient to completely close the primary defect. The remaining additional defect was repaired with the graft mentioned below.
Area H Indication Text: Tumors in this location are included in\\nArea H (central face, eyelids, canthi, eyebrows, nose, lips, chin, ears, genitals, hands, feet, nail units, ankles, nipples and areola).
Flap Type: Modified Advancement Flap
Epidermal Closure Graft Donor Site (Optional): running
Length To Time In Minutes Device Was In Place: 10
Graft Basting Suture (Optional): 6-0 Fast Absorbing Gut
Closure 3 Information: This tab is for additional flaps and grafts above and beyond our usual structured repairs.  Please note if you enter information here it will not currently bill and you will need to add the billing information manually.
Repair Type: Flap

## 2019-06-06 ENCOUNTER — APPOINTMENT (RX ONLY)
Dept: URBAN - METROPOLITAN AREA CLINIC 153 | Facility: CLINIC | Age: 84
Setting detail: DERMATOLOGY
End: 2019-06-06

## 2019-06-06 DIAGNOSIS — L57.0 ACTINIC KERATOSIS: ICD-10-CM

## 2019-06-06 PROBLEM — L29.8 OTHER PRURITUS: Status: ACTIVE | Noted: 2019-06-06

## 2019-06-06 PROBLEM — I10 ESSENTIAL (PRIMARY) HYPERTENSION: Status: ACTIVE | Noted: 2019-06-06

## 2019-06-06 PROBLEM — M12.9 ARTHROPATHY, UNSPECIFIED: Status: ACTIVE | Noted: 2019-06-06

## 2019-06-06 PROBLEM — C4A.21: Status: ACTIVE | Noted: 2019-06-06

## 2019-06-06 PROCEDURE — ? LIQUID NITROGEN

## 2019-06-06 PROCEDURE — 17000 DESTRUCT PREMALG LESION: CPT | Mod: 79

## 2019-06-06 PROCEDURE — 17003 DESTRUCT PREMALG LES 2-14: CPT | Mod: 79

## 2019-06-06 PROCEDURE — ? SUTURE REMOVAL (GLOBAL PERIOD)

## 2019-06-06 ASSESSMENT — LOCATION DETAILED DESCRIPTION DERM
LOCATION DETAILED: NASAL ROOT
LOCATION DETAILED: NASAL SUPRATIP
LOCATION DETAILED: LEFT SUPERIOR LATERAL MALAR CHEEK
LOCATION DETAILED: RIGHT INFERIOR CENTRAL MALAR CHEEK
LOCATION DETAILED: RIGHT CENTRAL MALAR CHEEK

## 2019-06-06 ASSESSMENT — LOCATION ZONE DERM
LOCATION ZONE: NOSE
LOCATION ZONE: FACE

## 2019-06-06 ASSESSMENT — LOCATION SIMPLE DESCRIPTION DERM
LOCATION SIMPLE: NOSE
LOCATION SIMPLE: LEFT CHEEK
LOCATION SIMPLE: RIGHT CHEEK

## 2019-06-06 NOTE — PROCEDURE: LIQUID NITROGEN
Consent: The patient's consent was obtained including but not limited to risks of crusting, scabbing, blistering, scarring, darker or lighter pigmentary change, recurrence, incomplete removal and infection.
Post-Care Instructions: I reviewed with the patient in detail post-care instructions. Patient is to wear sunprotection, and avoid picking at any of the treated lesions. Pt may apply Vaseline to crusted or scabbing areas.
Render Note In Bullet Format When Appropriate: No
Detail Level: Detailed
Duration Of Freeze Thaw-Cycle (Seconds): 0
Number Of Freeze-Thaw Cycles: 2 freeze-thaw cycles

## 2019-06-06 NOTE — PROCEDURE: SUTURE REMOVAL (GLOBAL PERIOD)
Add 05693 Cpt? (Important Note: In 2017 The Use Of 86907 Is Being Tracked By Cms To Determine Future Global Period Reimbursement For Global Periods): no
Detail Level: Detailed

## 2019-06-11 ENCOUNTER — APPOINTMENT (RX ONLY)
Dept: URBAN - METROPOLITAN AREA CLINIC 153 | Facility: CLINIC | Age: 84
Setting detail: DERMATOLOGY
End: 2019-06-11

## 2019-06-11 DIAGNOSIS — T81.31X: ICD-10-CM

## 2019-06-11 PROBLEM — L29.8 OTHER PRURITUS: Status: ACTIVE | Noted: 2019-06-11

## 2019-06-11 PROBLEM — T81.31XA DISRUPTION OF EXTERNAL OPERATION (SURGICAL) WOUND, NOT ELSEWHERE CLASSIFIED, INITIAL ENCOUNTER: Status: ACTIVE | Noted: 2019-06-11

## 2019-06-11 PROCEDURE — ? SURGICAL SERVICE OF COMPLICATION

## 2019-06-11 ASSESSMENT — LOCATION DETAILED DESCRIPTION DERM: LOCATION DETAILED: RIGHT ANTERIOR EARLOBE

## 2019-06-11 ASSESSMENT — LOCATION SIMPLE DESCRIPTION DERM: LOCATION SIMPLE: RIGHT EAR

## 2019-06-11 ASSESSMENT — LOCATION ZONE DERM: LOCATION ZONE: EAR

## 2019-06-11 NOTE — PROCEDURE: SURGICAL SERVICE OF COMPLICATION
Consent was obtained and risks were reviewed including but not limited to delayed wound healing, infection, bleeding, hematoma, dehiscence, and pain.
Size Of Lesion In Cm (Optional): 0.3
Anesthesia Type: 1% lidocaine with epinephrine and a 1:10 solution of 8.4% sodium bicarbonate
Anesthesia Volume In Cc: 1.5
Detail Level: Detailed
Surgical Service Of Complication: treatment of wound dehiscence, simple closure

## 2019-06-26 ENCOUNTER — APPOINTMENT (RX ONLY)
Dept: URBAN - METROPOLITAN AREA CLINIC 153 | Facility: CLINIC | Age: 84
Setting detail: DERMATOLOGY
End: 2019-06-26

## 2019-06-26 DIAGNOSIS — D485 NEOPLASM OF UNCERTAIN BEHAVIOR OF SKIN: ICD-10-CM

## 2019-06-26 PROBLEM — L85.3 XEROSIS CUTIS: Status: ACTIVE | Noted: 2019-06-26

## 2019-06-26 PROBLEM — C4A.21: Status: ACTIVE | Noted: 2019-06-26

## 2019-06-26 PROBLEM — D48.5 NEOPLASM OF UNCERTAIN BEHAVIOR OF SKIN: Status: ACTIVE | Noted: 2019-06-26

## 2019-06-26 PROCEDURE — ? SUTURE REMOVAL (GLOBAL PERIOD)

## 2019-06-26 PROCEDURE — ? BIOPSY BY SHAVE METHOD

## 2019-06-26 PROCEDURE — 11102 TANGNTL BX SKIN SINGLE LES: CPT | Mod: 79

## 2019-06-26 ASSESSMENT — LOCATION DETAILED DESCRIPTION DERM: LOCATION DETAILED: RIGHT INFERIOR CENTRAL MALAR CHEEK

## 2019-06-26 ASSESSMENT — LOCATION ZONE DERM: LOCATION ZONE: FACE

## 2019-06-26 ASSESSMENT — LOCATION SIMPLE DESCRIPTION DERM: LOCATION SIMPLE: RIGHT CHEEK

## 2019-06-26 NOTE — PROCEDURE: BIOPSY BY SHAVE METHOD
Size Of Lesion In Cm: 0
Was A Bandage Applied: Yes
Cryotherapy Text: The wound bed was treated with cryotherapy after the biopsy was performed.
Electrodesiccation Text: The wound bed was treated with electrodesiccation after the biopsy was performed.
Billing Type: Third-Party Bill
Detail Level: Detailed
Electrodesiccation And Curettage Text: The wound bed was treated with electrodesiccation and curettage after the biopsy was performed.
Body Location Override (Optional - Billing Will Still Be Based On Selected Body Map Location If Applicable): right malar cheek
Render Path Notes In Note?: No
Biopsy Type: H and E
Wound Care: Polysporin ointment
Depth Of Biopsy: dermis
Anesthesia Volume In Cc (Will Not Render If 0): 2
Notification Instructions: Patient will be notified of biopsy results. However, patient instructed to call the office if not contacted within 2 weeks.
Dressing: telfa dressing
Type Of Destruction Used: Curettage
Anesthesia Type: 1% lidocaine with epinephrine and a 1:10 solution of 8.4% sodium bicarbonate
Post-Care Instructions: I reviewed with the patient in detail post-care instructions.
Path Notes (To The Dermatopathologist): Tender, pink, scaly papule, recalcitrant to LN2; rule out BCC vs SCC vs HAK
Curettage Text: After shave biopsy the base of the lesion was removed with curettage.
Silver Nitrate Text: The wound bed was treated with silver nitrate after the biopsy was performed.
Biopsy Method: Dermablade
Hemostasis: Drysol
Consent: Written consent was obtained and risks were reviewed including but not limited to scarring, infection, bleeding, scabbing, incomplete removal, nerve damage and allergy to anesthesia.

## 2019-06-26 NOTE — PROCEDURE: SUTURE REMOVAL (GLOBAL PERIOD)
Add 11007 Cpt? (Important Note: In 2017 The Use Of 01997 Is Being Tracked By Cms To Determine Future Global Period Reimbursement For Global Periods): no
Detail Level: Detailed

## 2019-09-05 ENCOUNTER — APPOINTMENT (RX ONLY)
Dept: URBAN - METROPOLITAN AREA CLINIC 153 | Facility: CLINIC | Age: 84
Setting detail: DERMATOLOGY
End: 2019-09-05

## 2019-09-05 DIAGNOSIS — L82.0 INFLAMED SEBORRHEIC KERATOSIS: ICD-10-CM

## 2019-09-05 DIAGNOSIS — L57.0 ACTINIC KERATOSIS: ICD-10-CM

## 2019-09-05 DIAGNOSIS — Z85.828 PERSONAL HISTORY OF OTHER MALIGNANT NEOPLASM OF SKIN: ICD-10-CM

## 2019-09-05 PROBLEM — M12.9 ARTHROPATHY, UNSPECIFIED: Status: ACTIVE | Noted: 2019-09-05

## 2019-09-05 PROCEDURE — 17004 DESTROY PREMAL LESIONS 15/>: CPT

## 2019-09-05 PROCEDURE — ? LIQUID NITROGEN

## 2019-09-05 PROCEDURE — 99212 OFFICE O/P EST SF 10 MIN: CPT | Mod: 25

## 2019-09-05 PROCEDURE — ? COUNSELING

## 2019-09-05 ASSESSMENT — LOCATION ZONE DERM
LOCATION ZONE: FACE
LOCATION ZONE: SCALP
LOCATION ZONE: EAR
LOCATION ZONE: NECK
LOCATION ZONE: TRUNK
LOCATION ZONE: HAND
LOCATION ZONE: ARM

## 2019-09-05 ASSESSMENT — LOCATION DETAILED DESCRIPTION DERM
LOCATION DETAILED: LEFT LATERAL MALAR CHEEK
LOCATION DETAILED: LEFT PROXIMAL DORSAL FOREARM
LOCATION DETAILED: LEFT CENTRAL LATERAL NECK
LOCATION DETAILED: LEFT RADIAL DORSAL HAND
LOCATION DETAILED: RIGHT CENTRAL MALAR CHEEK
LOCATION DETAILED: RIGHT DISTAL DORSAL FOREARM
LOCATION DETAILED: LEFT MEDIAL SUPERIOR CHEST
LOCATION DETAILED: RIGHT SUPERIOR MEDIAL FOREHEAD
LOCATION DETAILED: LEFT MEDIAL ZYGOMA
LOCATION DETAILED: RIGHT ANTITRAGUS
LOCATION DETAILED: RIGHT CENTRAL FRONTAL SCALP
LOCATION DETAILED: LEFT SUPERIOR LATERAL NECK
LOCATION DETAILED: RIGHT FOREHEAD
LOCATION DETAILED: LEFT DISTAL ULNAR DORSAL FOREARM
LOCATION DETAILED: RIGHT INFERIOR HELIX
LOCATION DETAILED: RIGHT INFERIOR FOREHEAD

## 2019-09-05 ASSESSMENT — LOCATION SIMPLE DESCRIPTION DERM
LOCATION SIMPLE: RIGHT FOREARM
LOCATION SIMPLE: RIGHT CHEEK
LOCATION SIMPLE: CHEST
LOCATION SIMPLE: RIGHT EAR
LOCATION SIMPLE: NECK
LOCATION SIMPLE: SCALP
LOCATION SIMPLE: LEFT CHEEK
LOCATION SIMPLE: LEFT HAND
LOCATION SIMPLE: LEFT ZYGOMA
LOCATION SIMPLE: RIGHT FOREHEAD
LOCATION SIMPLE: LEFT FOREARM

## 2019-09-05 NOTE — PROCEDURE: LIQUID NITROGEN
Post-Care Instructions: I reviewed with the patient in detail post-care instructions. Patient is to wear sunprotection, and avoid picking at any of the treated lesions. Pt may apply Vaseline to crusted or scabbing areas.
Detail Level: Simple
Add 52 Modifier (Optional): no
Duration Of Freeze Thaw-Cycle (Seconds): 0
Medical Necessity Information: It is in your best interest to select a reason for this procedure from the list below. All of these items fulfill various CMS LCD requirements except the new and changing color options.
Number Of Freeze-Thaw Cycles: 2 freeze-thaw cycles
Medical Necessity Clause: This procedure was medically necessary because the lesions that were treated were: itchy and excoriated and bleeding.
Render Post-Care Instructions In Note?: yes
Detail Level: Detailed
Consent: The patient's consent was obtained including but not limited to risks of crusting, scabbing, blistering, scarring, darker or lighter pigmentary change, recurrence, incomplete removal and infection.

## 2019-11-13 ENCOUNTER — APPOINTMENT (RX ONLY)
Dept: URBAN - METROPOLITAN AREA CLINIC 153 | Facility: CLINIC | Age: 84
Setting detail: DERMATOLOGY
End: 2019-11-13

## 2019-11-13 DIAGNOSIS — Z85.828 PERSONAL HISTORY OF OTHER MALIGNANT NEOPLASM OF SKIN: ICD-10-CM

## 2019-11-13 DIAGNOSIS — L81.4 OTHER MELANIN HYPERPIGMENTATION: ICD-10-CM

## 2019-11-13 DIAGNOSIS — L57.0 ACTINIC KERATOSIS: ICD-10-CM

## 2019-11-13 PROBLEM — Z85.46 PERSONAL HISTORY OF MALIGNANT NEOPLASM OF PROSTATE: Status: ACTIVE | Noted: 2019-11-13

## 2019-11-13 PROBLEM — H91.90 UNSPECIFIED HEARING LOSS, UNSPECIFIED EAR: Status: ACTIVE | Noted: 2019-11-13

## 2019-11-13 PROCEDURE — ? PRESCRIPTION

## 2019-11-13 PROCEDURE — ? COUNSELING

## 2019-11-13 PROCEDURE — 99213 OFFICE O/P EST LOW 20 MIN: CPT

## 2019-11-13 RX ORDER — FLUOROURACIL 50 MG/G
CREAM TOPICAL
Qty: 1 | Refills: 3 | Status: ERX | COMMUNITY
Start: 2019-11-13

## 2019-11-13 RX ADMIN — FLUOROURACIL: 50 CREAM TOPICAL at 20:54

## 2019-11-13 ASSESSMENT — LOCATION DETAILED DESCRIPTION DERM
LOCATION DETAILED: RIGHT DISTAL DORSAL FOREARM
LOCATION DETAILED: LEFT INFERIOR ANTERIOR NECK
LOCATION DETAILED: LEFT DISTAL DORSAL FOREARM
LOCATION DETAILED: RIGHT PROXIMAL DORSAL FOREARM
LOCATION DETAILED: LEFT INFERIOR LATERAL FOREHEAD
LOCATION DETAILED: LEFT INFERIOR POSTERIOR NECK
LOCATION DETAILED: LEFT SUPERIOR MEDIAL FOREHEAD
LOCATION DETAILED: RIGHT SUPERIOR CRUS OF ANTIHELIX
LOCATION DETAILED: RIGHT LATERAL FOREHEAD
LOCATION DETAILED: LEFT PROXIMAL DORSAL FOREARM
LOCATION DETAILED: NASAL SUPRATIP

## 2019-11-13 ASSESSMENT — LOCATION SIMPLE DESCRIPTION DERM
LOCATION SIMPLE: LEFT FOREHEAD
LOCATION SIMPLE: RIGHT FOREHEAD
LOCATION SIMPLE: LEFT FOREARM
LOCATION SIMPLE: NOSE
LOCATION SIMPLE: LEFT ANTERIOR NECK
LOCATION SIMPLE: POSTERIOR NECK
LOCATION SIMPLE: RIGHT FOREARM
LOCATION SIMPLE: RIGHT EAR

## 2019-11-13 ASSESSMENT — LOCATION ZONE DERM
LOCATION ZONE: ARM
LOCATION ZONE: EAR
LOCATION ZONE: NECK
LOCATION ZONE: NOSE
LOCATION ZONE: FACE

## 2020-02-13 ENCOUNTER — APPOINTMENT (RX ONLY)
Dept: URBAN - METROPOLITAN AREA CLINIC 153 | Facility: CLINIC | Age: 85
Setting detail: DERMATOLOGY
End: 2020-02-13

## 2020-02-13 DIAGNOSIS — L57.0 ACTINIC KERATOSIS: ICD-10-CM

## 2020-02-13 PROBLEM — M12.9 ARTHROPATHY, UNSPECIFIED: Status: ACTIVE | Noted: 2020-02-13

## 2020-02-13 PROBLEM — H91.90 UNSPECIFIED HEARING LOSS, UNSPECIFIED EAR: Status: ACTIVE | Noted: 2020-02-13

## 2020-02-13 PROBLEM — C4A.4 MERKEL CELL CARCINOMA OF SCALP AND NECK: Status: ACTIVE | Noted: 2020-02-13

## 2020-02-13 PROCEDURE — ? COUNSELING

## 2020-02-13 PROCEDURE — 17003 DESTRUCT PREMALG LES 2-14: CPT

## 2020-02-13 PROCEDURE — 99212 OFFICE O/P EST SF 10 MIN: CPT | Mod: 25

## 2020-02-13 PROCEDURE — 17000 DESTRUCT PREMALG LESION: CPT

## 2020-02-13 PROCEDURE — ? LIQUID NITROGEN

## 2020-02-13 ASSESSMENT — LOCATION SIMPLE DESCRIPTION DERM
LOCATION SIMPLE: LEFT FOREARM
LOCATION SIMPLE: LEFT EAR
LOCATION SIMPLE: RIGHT FOREARM

## 2020-02-13 ASSESSMENT — LOCATION DETAILED DESCRIPTION DERM
LOCATION DETAILED: LEFT POSTERIOR EAR
LOCATION DETAILED: RIGHT DISTAL DORSAL FOREARM
LOCATION DETAILED: RIGHT PROXIMAL DORSAL FOREARM
LOCATION DETAILED: LEFT DISTAL DORSAL FOREARM
LOCATION DETAILED: LEFT SUPERIOR POSTERIOR HELIX
LOCATION DETAILED: LEFT PROXIMAL DORSAL FOREARM

## 2020-02-13 ASSESSMENT — LOCATION ZONE DERM
LOCATION ZONE: ARM
LOCATION ZONE: EAR

## 2020-05-06 ENCOUNTER — APPOINTMENT (RX ONLY)
Dept: URBAN - METROPOLITAN AREA CLINIC 153 | Facility: CLINIC | Age: 85
Setting detail: DERMATOLOGY
End: 2020-05-06

## 2020-05-06 VITALS — TEMPERATURE: 97 F

## 2020-05-06 DIAGNOSIS — Z85.828 PERSONAL HISTORY OF OTHER MALIGNANT NEOPLASM OF SKIN: ICD-10-CM

## 2020-05-06 PROBLEM — C4A.4 MERKEL CELL CARCINOMA OF SCALP AND NECK: Status: ACTIVE | Noted: 2020-05-06

## 2020-05-06 PROBLEM — Z85.46 PERSONAL HISTORY OF MALIGNANT NEOPLASM OF PROSTATE: Status: ACTIVE | Noted: 2020-05-06

## 2020-05-06 PROBLEM — M12.9 ARTHROPATHY, UNSPECIFIED: Status: ACTIVE | Noted: 2020-05-06

## 2020-05-06 PROCEDURE — ? COUNSELING

## 2020-05-06 PROCEDURE — 99213 OFFICE O/P EST LOW 20 MIN: CPT

## 2020-05-06 ASSESSMENT — LOCATION ZONE DERM
LOCATION ZONE: FACE
LOCATION ZONE: EAR

## 2020-05-06 ASSESSMENT — LOCATION DETAILED DESCRIPTION DERM
LOCATION DETAILED: RIGHT LATERAL MALAR CHEEK
LOCATION DETAILED: RIGHT TRAGUS

## 2020-05-06 ASSESSMENT — LOCATION SIMPLE DESCRIPTION DERM
LOCATION SIMPLE: RIGHT EAR
LOCATION SIMPLE: RIGHT CHEEK

## 2020-05-06 NOTE — PROCEDURE: COUNSELING
Detail Level: Detailed
Patient Specific Counseling (Will Not Stick From Patient To Patient): He is disinclined to undergo a major neck surgery due to age and other health risks.  Further oncologic evaluation is ongoing.

## 2020-07-28 ENCOUNTER — APPOINTMENT (RX ONLY)
Dept: URBAN - METROPOLITAN AREA CLINIC 153 | Facility: CLINIC | Age: 85
Setting detail: DERMATOLOGY
End: 2020-07-28

## 2020-07-28 VITALS — TEMPERATURE: 97.2 F

## 2020-07-28 DIAGNOSIS — D485 NEOPLASM OF UNCERTAIN BEHAVIOR OF SKIN: ICD-10-CM

## 2020-07-28 PROBLEM — C4A.4 MERKEL CELL CARCINOMA OF SCALP AND NECK: Status: ACTIVE | Noted: 2020-07-28

## 2020-07-28 PROBLEM — D48.5 NEOPLASM OF UNCERTAIN BEHAVIOR OF SKIN: Status: ACTIVE | Noted: 2020-07-28

## 2020-07-28 PROCEDURE — ? BIOPSY BY SHAVE METHOD

## 2020-07-28 PROCEDURE — 11102 TANGNTL BX SKIN SINGLE LES: CPT

## 2020-07-28 PROCEDURE — ? COUNSELING

## 2020-07-28 ASSESSMENT — LOCATION ZONE DERM: LOCATION ZONE: ARM

## 2020-07-28 ASSESSMENT — LOCATION SIMPLE DESCRIPTION DERM: LOCATION SIMPLE: RIGHT FOREARM

## 2020-07-28 ASSESSMENT — LOCATION DETAILED DESCRIPTION DERM: LOCATION DETAILED: RIGHT DISTAL DORSAL FOREARM

## 2020-07-28 NOTE — PROCEDURE: BIOPSY BY SHAVE METHOD
Hemostasis: Electrocautery
Wound Care: Polysporin ointment
Render Post-Care Instructions In Note?: no
Dressing: telfa dressing
Electrodesiccation And Curettage Text: The wound bed was treated with electrodesiccation and curettage after the biopsy was performed.
Post-Care Instructions: I reviewed with the patient in detail post-care instructions.
Type Of Destruction Used: Curettage
Curettage Text: After shave biopsy the base of the lesion was removed with curettage.
Additional Anesthesia Volume In Cc (Will Not Render If 0): 0
Biopsy Type: H and E
Biopsy Method: Dermablade
Electrodesiccation Text: The wound bed was treated with electrodesiccation after the biopsy was performed.
Consent: Written consent was obtained and risks were reviewed including but not limited to scarring, infection, bleeding, scabbing, incomplete removal, nerve damage and allergy to anesthesia.
Path Notes (To The Dermatopathologist): Enlarging, keratotic papule r/o SCC, pt on chemotherapy for Merkel cell Cancer
Billing Type: Third-Party Bill
Anesthesia Type: 1% lidocaine with epinephrine and a 1:10 solution of 8.4% sodium bicarbonate
Validate Note Data (See Information Below): Yes
Anesthesia Volume In Cc (Will Not Render If 0): 2
Silver Nitrate Text: The wound bed was treated with silver nitrate after the biopsy was performed.
Notification Instructions: Patient will be notified of biopsy results. However, patient instructed to call the office if not contacted within 2 weeks.
Detail Level: Detailed
Depth Of Biopsy: dermis
Cryotherapy Text: The wound bed was treated with cryotherapy after the biopsy was performed.
Information: Selecting Yes will display possible errors in your note based on the variables you have selected. This validation is only offered as a suggestion for you. PLEASE NOTE THAT THE VALIDATION TEXT WILL BE REMOVED WHEN YOU FINALIZE YOUR NOTE. IF YOU WANT TO FAX A PRELIMINARY NOTE YOU WILL NEED TO TOGGLE THIS TO 'NO' IF YOU DO NOT WANT IT IN YOUR FAXED NOTE.

## 2020-11-10 NOTE — OPERATIVE REPORT
DATE OF PROCEDURE:    

 

PREOPERATIVE DIAGNOSES:

1. Right foot fractured sesamoid with osteoarthritis, possible 

osteomyelitis.

2. Acute gout.

3. Plantar ulceration.



PLANNED PROCEDURES:

1. Right medial sesamoidectomy.

2. Right first metatarsal head condylectomy.



SURGEON:  Dr. Ashley DPM



ASSISTANT:  Justine Sigala DPM



ANESTHESIA:  General with a postoperative block consisting of 10 mL of 0.5% 

Marcaine plain mixed with 1 mL of dexamethasone phosphate.



HEMOSTASIS:  Esmarch tourniquet applied for approximately 20 minutes.



MATERIALS:  3-0 Vicryl, 4-0 Prolene.



ESTIMATED BLOOD LOSS:  Less than 10 mL.



PATHOLOGY:  Right tibial sesamoid sent for gross specimen.



PROCEDURE NOTE:  The patient was seen in the preoperative waiting room 

where the correct procedure and site were identified.  The patient was 

brought to the operating room and placed on the operating table in the 

supine position.  General anesthesia was initiated at this time.  The right 

foot, ankle, and leg were then scrubbed, prepped, and draped in the usual 

aseptic manner.  The right foot, ankle, and leg were exsanguinated with an 

Esmarch bandage, and Esmarch tourniquet was applied for approximately 20 

minutes.



Attention was directed to the medial aspect of the patient's right first 

metatarsophalangeal joint.  A 3-cm linear incision was made directly over 

the medial aspect of the joint.  The incision was carried through the 

subcutaneous tissue,  them from deeper underlying structures.  

All vital neurovascular structures were identified, retracted medially and 

laterally, and all bleeders were cauterized or ligated as deemed necessary. 

 At this time, a linear horizontal incision was made in the joint capsule.  

This was reflected to allow for good visualization of the medial tibial 

sesamoid as well as the plantar aspect of the first metatarsal head.  The 

medial tibial sesamoid was noted to be inflamed and large.  There is 

denudation of cartilage on the plantar aspect of the first metatarsal head 

as well as the dorsal articular surface of the sesamoid.  Utilizing a 

15-blade, the tibial sesamoid was disarticulated from the 

metatarsophalangeal joint utilizing a Kocher and passed off to the back 

table to be sent for gross specimen to determine if osteomyelitis is 

present.  The wound was then flushed with copious amounts of sterile 

saline.  Capsule and deep tissue reapproximated with 3-0 Vicryl, 

subcutaneous tissue with 3-0 Vicryl, and the skin was closed using a simple 

interrupted suture with 4-0 Prolene.  Incision site was then dressed with 

Adaptic, 4 x 4's, Kerlix, Ace wrap, and postop shoe.



Patient tolerated the procedure and anesthesia well.  The patient was 

transferred to the postoperative recovery unit with vital signs stable and 

vascular status intact.  The patient was monitored there for a short period 

of time before being sent home with the following written and oral 

instructions:

1. Keep the dressing clean, dry, and intact.

2. The patient is to remain partial weightbearing to the postop shoe and to 

avoid excessive ambulation until being seen in the office.

3. The patient was given the office number and instructed to contact us if 

any problems should arise.





Dictated by:  Justine Sigala DPM





DD:  02/12/2019 07:14

DT:  02/12/2019 07:20

Job#:  [id number]  [facility]